# Patient Record
Sex: FEMALE | Race: WHITE | NOT HISPANIC OR LATINO | URBAN - METROPOLITAN AREA
[De-identification: names, ages, dates, MRNs, and addresses within clinical notes are randomized per-mention and may not be internally consistent; named-entity substitution may affect disease eponyms.]

---

## 2018-05-18 ENCOUNTER — OUTPATIENT (OUTPATIENT)
Dept: OUTPATIENT SERVICES | Facility: HOSPITAL | Age: 83
LOS: 1 days | Discharge: HOME | End: 2018-05-18

## 2018-05-18 DIAGNOSIS — C85.88 OTHER SPECIFIED TYPES OF NON-HODGKIN LYMPHOMA, LYMPH NODES OF MULTIPLE SITES: ICD-10-CM

## 2018-05-23 ENCOUNTER — LABORATORY RESULT (OUTPATIENT)
Age: 83
End: 2018-05-23

## 2018-05-23 ENCOUNTER — RESULT REVIEW (OUTPATIENT)
Age: 83
End: 2018-05-23

## 2018-05-23 ENCOUNTER — APPOINTMENT (OUTPATIENT)
Dept: HEMATOLOGY ONCOLOGY | Facility: CLINIC | Age: 83
End: 2018-05-23
Payer: MEDICARE

## 2018-05-23 VITALS
DIASTOLIC BLOOD PRESSURE: 66 MMHG | SYSTOLIC BLOOD PRESSURE: 106 MMHG | OXYGEN SATURATION: 99 % | WEIGHT: 101.41 LBS | TEMPERATURE: 98.2 F | HEART RATE: 72 BPM | RESPIRATION RATE: 16 BRPM

## 2018-05-23 DIAGNOSIS — M06.9 RHEUMATOID ARTHRITIS, UNSPECIFIED: ICD-10-CM

## 2018-05-23 DIAGNOSIS — C83.30 DIFFUSE LARGE B-CELL LYMPHOMA, UNSPECIFIED SITE: ICD-10-CM

## 2018-05-23 DIAGNOSIS — I10 ESSENTIAL (PRIMARY) HYPERTENSION: ICD-10-CM

## 2018-05-23 LAB
BASOPHILS # BLD AUTO: 0.1 K/UL — SIGNIFICANT CHANGE UP (ref 0–0.2)
BASOPHILS NFR BLD AUTO: 0.4 % — SIGNIFICANT CHANGE UP (ref 0–2)
EOSINOPHIL # BLD AUTO: 0 K/UL — SIGNIFICANT CHANGE UP (ref 0–0.5)
EOSINOPHIL NFR BLD AUTO: 0.3 % — SIGNIFICANT CHANGE UP (ref 0–6)
HCT VFR BLD CALC: 35.4 % — SIGNIFICANT CHANGE UP (ref 34.5–45)
HGB BLD-MCNC: 11.5 G/DL — SIGNIFICANT CHANGE UP (ref 11.5–15.5)
LYMPHOCYTES # BLD AUTO: 1.2 K/UL — SIGNIFICANT CHANGE UP (ref 1–3.3)
LYMPHOCYTES # BLD AUTO: 7.8 % — LOW (ref 13–44)
MCHC RBC-ENTMCNC: 31.9 PG — SIGNIFICANT CHANGE UP (ref 27–34)
MCHC RBC-ENTMCNC: 32.4 G/DL — SIGNIFICANT CHANGE UP (ref 32–36)
MCV RBC AUTO: 98.5 FL — SIGNIFICANT CHANGE UP (ref 80–100)
MONOCYTES # BLD AUTO: 0.7 K/UL — SIGNIFICANT CHANGE UP (ref 0–0.9)
MONOCYTES NFR BLD AUTO: 4.8 % — SIGNIFICANT CHANGE UP (ref 2–14)
NEUTROPHILS # BLD AUTO: 12.8 K/UL — HIGH (ref 1.8–7.4)
NEUTROPHILS NFR BLD AUTO: 86.7 % — HIGH (ref 43–77)
PLATELET # BLD AUTO: 285 K/UL — SIGNIFICANT CHANGE UP (ref 150–400)
RBC # BLD: 3.59 M/UL — LOW (ref 3.8–5.2)
RBC # FLD: 13.5 % — SIGNIFICANT CHANGE UP (ref 10.3–14.5)
WBC # BLD: 14.7 K/UL — HIGH (ref 3.8–10.5)
WBC # FLD AUTO: 14.7 K/UL — HIGH (ref 3.8–10.5)

## 2018-05-23 PROCEDURE — 99214 OFFICE O/P EST MOD 30 MIN: CPT

## 2018-05-24 LAB
ALBUMIN SERPL ELPH-MCNC: 3.7 G/DL
ALP BLD-CCNC: 106 U/L
ALT SERPL-CCNC: 25 U/L
ANION GAP SERPL CALC-SCNC: 14 MMOL/L
AST SERPL-CCNC: 21 U/L
B2 MICROGLOB SERPL-MCNC: 2.9 MG/L
BILIRUB SERPL-MCNC: 0.8 MG/DL
BUN SERPL-MCNC: 26 MG/DL
CALCIUM SERPL-MCNC: 9.3 MG/DL
CHLORIDE SERPL-SCNC: 106 MMOL/L
CO2 SERPL-SCNC: 23 MMOL/L
CREAT SERPL-MCNC: 0.89 MG/DL
DEPRECATED KAPPA LC FREE/LAMBDA SER: 0.8 RATIO
GLUCOSE SERPL-MCNC: 150 MG/DL
IGA SER QL IEP: 72 MG/DL
IGG SER QL IEP: 390 MG/DL
IGM SER QL IEP: 47 MG/DL
KAPPA LC CSF-MCNC: 1.18 MG/DL
KAPPA LC SERPL-MCNC: 0.94 MG/DL
LDH SERPL-CCNC: 260 U/L
POTASSIUM SERPL-SCNC: 4.2 MMOL/L
PROT SERPL-MCNC: 5.9 G/DL
SODIUM SERPL-SCNC: 143 MMOL/L

## 2018-05-28 ENCOUNTER — TRANSCRIPTION ENCOUNTER (OUTPATIENT)
Age: 83
End: 2018-05-28

## 2018-05-29 ENCOUNTER — INPATIENT (INPATIENT)
Facility: HOSPITAL | Age: 83
LOS: 2 days | Discharge: NURSING, REHAB OR LONG-TERM CARE | DRG: 494 | End: 2018-06-01
Attending: SPECIALIST | Admitting: SPECIALIST
Payer: MEDICARE

## 2018-05-29 VITALS
DIASTOLIC BLOOD PRESSURE: 95 MMHG | HEART RATE: 100 BPM | OXYGEN SATURATION: 99 % | SYSTOLIC BLOOD PRESSURE: 173 MMHG | RESPIRATION RATE: 18 BRPM

## 2018-05-29 DIAGNOSIS — I87.2 VENOUS INSUFFICIENCY (CHRONIC) (PERIPHERAL): ICD-10-CM

## 2018-05-29 DIAGNOSIS — S82.899A OTHER FRACTURE OF UNSPECIFIED LOWER LEG, INITIAL ENCOUNTER FOR CLOSED FRACTURE: ICD-10-CM

## 2018-05-29 DIAGNOSIS — I89.0 LYMPHEDEMA, NOT ELSEWHERE CLASSIFIED: ICD-10-CM

## 2018-05-29 LAB
ALBUMIN SERPL ELPH-MCNC: 4.4 G/DL — SIGNIFICANT CHANGE UP (ref 3.3–5)
ALP SERPL-CCNC: 152 U/L — HIGH (ref 40–120)
ALT FLD-CCNC: 21 U/L — SIGNIFICANT CHANGE UP (ref 10–45)
ANION GAP SERPL CALC-SCNC: 16 MMOL/L — SIGNIFICANT CHANGE UP (ref 5–17)
APTT BLD: 26.7 SEC — LOW (ref 27.5–37.4)
AST SERPL-CCNC: 20 U/L — SIGNIFICANT CHANGE UP (ref 10–40)
BASOPHILS # BLD AUTO: 0.1 K/UL — SIGNIFICANT CHANGE UP (ref 0–0.2)
BILIRUB SERPL-MCNC: 0.6 MG/DL — SIGNIFICANT CHANGE UP (ref 0.2–1.2)
BLD GP AB SCN SERPL QL: NEGATIVE — SIGNIFICANT CHANGE UP
BUN SERPL-MCNC: 33 MG/DL — HIGH (ref 7–23)
CALCIUM SERPL-MCNC: 10.1 MG/DL — SIGNIFICANT CHANGE UP (ref 8.4–10.5)
CHLORIDE SERPL-SCNC: 102 MMOL/L — SIGNIFICANT CHANGE UP (ref 96–108)
CO2 SERPL-SCNC: 23 MMOL/L — SIGNIFICANT CHANGE UP (ref 22–31)
CREAT SERPL-MCNC: 1.03 MG/DL — SIGNIFICANT CHANGE UP (ref 0.5–1.3)
EOSINOPHIL # BLD AUTO: 0.2 K/UL — SIGNIFICANT CHANGE UP (ref 0–0.5)
GLUCOSE SERPL-MCNC: 121 MG/DL — HIGH (ref 70–99)
HCT VFR BLD CALC: 39.7 % — SIGNIFICANT CHANGE UP (ref 34.5–45)
HGB BLD-MCNC: 12.5 G/DL — SIGNIFICANT CHANGE UP (ref 11.5–15.5)
INR BLD: 0.96 RATIO — SIGNIFICANT CHANGE UP (ref 0.88–1.16)
LYMPHOCYTES # BLD AUTO: 11 % — LOW (ref 13–44)
LYMPHOCYTES # BLD AUTO: 3.4 K/UL — HIGH (ref 1–3.3)
MCHC RBC-ENTMCNC: 31.6 GM/DL — LOW (ref 32–36)
MCHC RBC-ENTMCNC: 32.5 PG — SIGNIFICANT CHANGE UP (ref 27–34)
MCV RBC AUTO: 103 FL — HIGH (ref 80–100)
MONOCYTES # BLD AUTO: 2.2 K/UL — HIGH (ref 0–0.9)
MONOCYTES NFR BLD AUTO: 13 % — SIGNIFICANT CHANGE UP (ref 2–14)
NEUTROPHILS # BLD AUTO: 19.2 K/UL — HIGH (ref 1.8–7.4)
NEUTROPHILS NFR BLD AUTO: 74 % — SIGNIFICANT CHANGE UP (ref 43–77)
PLAT MORPH BLD: NORMAL — SIGNIFICANT CHANGE UP
PLATELET # BLD AUTO: 320 K/UL — SIGNIFICANT CHANGE UP (ref 150–400)
POTASSIUM SERPL-MCNC: 5.2 MMOL/L — SIGNIFICANT CHANGE UP (ref 3.5–5.3)
POTASSIUM SERPL-SCNC: 5.2 MMOL/L — SIGNIFICANT CHANGE UP (ref 3.5–5.3)
PROT SERPL-MCNC: 6.7 G/DL — SIGNIFICANT CHANGE UP (ref 6–8.3)
PROTHROM AB SERPL-ACNC: 10.5 SEC — SIGNIFICANT CHANGE UP (ref 9.8–12.7)
RBC # BLD: 3.86 M/UL — SIGNIFICANT CHANGE UP (ref 3.8–5.2)
RBC # FLD: 13.8 % — SIGNIFICANT CHANGE UP (ref 10.3–14.5)
RBC BLD AUTO: SIGNIFICANT CHANGE UP
RH IG SCN BLD-IMP: POSITIVE — SIGNIFICANT CHANGE UP
RH IG SCN BLD-IMP: POSITIVE — SIGNIFICANT CHANGE UP
SODIUM SERPL-SCNC: 141 MMOL/L — SIGNIFICANT CHANGE UP (ref 135–145)
VARIANT LYMPHS # BLD: 2 % — SIGNIFICANT CHANGE UP (ref 0–6)
WBC # BLD: 25 K/UL — HIGH (ref 3.8–10.5)
WBC # FLD AUTO: 25 K/UL — HIGH (ref 3.8–10.5)

## 2018-05-29 PROCEDURE — 76377 3D RENDER W/INTRP POSTPROCES: CPT | Mod: 26

## 2018-05-29 PROCEDURE — 99233 SBSQ HOSP IP/OBS HIGH 50: CPT

## 2018-05-29 PROCEDURE — 99284 EMERGENCY DEPT VISIT MOD MDM: CPT | Mod: 25,GC

## 2018-05-29 PROCEDURE — 93010 ELECTROCARDIOGRAM REPORT: CPT

## 2018-05-29 PROCEDURE — 73562 X-RAY EXAM OF KNEE 3: CPT | Mod: 26,LT

## 2018-05-29 PROCEDURE — 73600 X-RAY EXAM OF ANKLE: CPT | Mod: 26,59,LT

## 2018-05-29 PROCEDURE — 73610 X-RAY EXAM OF ANKLE: CPT | Mod: 26,77,LT

## 2018-05-29 PROCEDURE — 73502 X-RAY EXAM HIP UNI 2-3 VIEWS: CPT | Mod: 26,RT

## 2018-05-29 PROCEDURE — 99221 1ST HOSP IP/OBS SF/LOW 40: CPT

## 2018-05-29 PROCEDURE — 72170 X-RAY EXAM OF PELVIS: CPT | Mod: 26,59

## 2018-05-29 PROCEDURE — 73700 CT LOWER EXTREMITY W/O DYE: CPT | Mod: 26,LT

## 2018-05-29 PROCEDURE — 71045 X-RAY EXAM CHEST 1 VIEW: CPT | Mod: 26

## 2018-05-29 RX ORDER — CEFAZOLIN SODIUM 1 G
2000 VIAL (EA) INJECTION EVERY 8 HOURS
Qty: 0 | Refills: 0 | Status: COMPLETED | OUTPATIENT
Start: 2018-05-29 | End: 2018-05-31

## 2018-05-29 RX ORDER — TETANUS TOXOID, REDUCED DIPHTHERIA TOXOID AND ACELLULAR PERTUSSIS VACCINE, ADSORBED 5; 2.5; 8; 8; 2.5 [IU]/.5ML; [IU]/.5ML; UG/.5ML; UG/.5ML; UG/.5ML
0.5 SUSPENSION INTRAMUSCULAR ONCE
Qty: 0 | Refills: 0 | Status: COMPLETED | OUTPATIENT
Start: 2018-05-29 | End: 2018-05-29

## 2018-05-29 RX ORDER — FENTANYL CITRATE 50 UG/ML
50 INJECTION INTRAVENOUS ONCE
Qty: 0 | Refills: 0 | Status: DISCONTINUED | OUTPATIENT
Start: 2018-05-29 | End: 2018-05-29

## 2018-05-29 RX ORDER — DOCUSATE SODIUM 100 MG
100 CAPSULE ORAL THREE TIMES A DAY
Qty: 0 | Refills: 0 | Status: DISCONTINUED | OUTPATIENT
Start: 2018-05-29 | End: 2018-06-01

## 2018-05-29 RX ORDER — VANCOMYCIN HCL 1 G
1000 VIAL (EA) INTRAVENOUS ONCE
Qty: 0 | Refills: 0 | Status: COMPLETED | OUTPATIENT
Start: 2018-05-29 | End: 2018-05-29

## 2018-05-29 RX ORDER — SIMVASTATIN 20 MG/1
20 TABLET, FILM COATED ORAL AT BEDTIME
Qty: 0 | Refills: 0 | Status: DISCONTINUED | OUTPATIENT
Start: 2018-05-29 | End: 2018-06-01

## 2018-05-29 RX ORDER — FENTANYL CITRATE 50 UG/ML
100 INJECTION INTRAVENOUS ONCE
Qty: 0 | Refills: 0 | Status: DISCONTINUED | OUTPATIENT
Start: 2018-05-29 | End: 2018-05-29

## 2018-05-29 RX ORDER — ALLOPURINOL 300 MG
100 TABLET ORAL DAILY
Qty: 0 | Refills: 0 | Status: DISCONTINUED | OUTPATIENT
Start: 2018-05-29 | End: 2018-05-29

## 2018-05-29 RX ORDER — ACETAMINOPHEN 500 MG
650 TABLET ORAL EVERY 6 HOURS
Qty: 0 | Refills: 0 | Status: DISCONTINUED | OUTPATIENT
Start: 2018-05-29 | End: 2018-06-01

## 2018-05-29 RX ORDER — ONDANSETRON 8 MG/1
4 TABLET, FILM COATED ORAL EVERY 6 HOURS
Qty: 0 | Refills: 0 | Status: DISCONTINUED | OUTPATIENT
Start: 2018-05-29 | End: 2018-06-01

## 2018-05-29 RX ORDER — ASPIRIN/CALCIUM CARB/MAGNESIUM 324 MG
81 TABLET ORAL
Qty: 0 | Refills: 0 | Status: DISCONTINUED | OUTPATIENT
Start: 2018-05-29 | End: 2018-06-01

## 2018-05-29 RX ORDER — ACETAMINOPHEN 500 MG
1000 TABLET ORAL ONCE
Qty: 0 | Refills: 0 | Status: COMPLETED | OUTPATIENT
Start: 2018-05-29 | End: 2018-05-29

## 2018-05-29 RX ORDER — OXYCODONE HYDROCHLORIDE 5 MG/1
2.5 TABLET ORAL EVERY 6 HOURS
Qty: 0 | Refills: 0 | Status: DISCONTINUED | OUTPATIENT
Start: 2018-05-29 | End: 2018-06-01

## 2018-05-29 RX ORDER — MAGNESIUM HYDROXIDE 400 MG/1
30 TABLET, CHEWABLE ORAL DAILY
Qty: 0 | Refills: 0 | Status: DISCONTINUED | OUTPATIENT
Start: 2018-05-29 | End: 2018-06-01

## 2018-05-29 RX ORDER — MORPHINE SULFATE 50 MG/1
2 CAPSULE, EXTENDED RELEASE ORAL EVERY 6 HOURS
Qty: 0 | Refills: 0 | Status: DISCONTINUED | OUTPATIENT
Start: 2018-05-29 | End: 2018-06-01

## 2018-05-29 RX ORDER — LOSARTAN POTASSIUM 100 MG/1
50 TABLET, FILM COATED ORAL DAILY
Qty: 0 | Refills: 0 | Status: DISCONTINUED | OUTPATIENT
Start: 2018-05-29 | End: 2018-06-01

## 2018-05-29 RX ORDER — SODIUM CHLORIDE 9 MG/ML
1000 INJECTION INTRAMUSCULAR; INTRAVENOUS; SUBCUTANEOUS
Qty: 0 | Refills: 0 | Status: DISCONTINUED | OUTPATIENT
Start: 2018-05-29 | End: 2018-06-01

## 2018-05-29 RX ORDER — SODIUM CHLORIDE 9 MG/ML
1000 INJECTION INTRAMUSCULAR; INTRAVENOUS; SUBCUTANEOUS ONCE
Qty: 0 | Refills: 0 | Status: COMPLETED | OUTPATIENT
Start: 2018-05-29 | End: 2018-05-29

## 2018-05-29 RX ORDER — MIDAZOLAM HYDROCHLORIDE 1 MG/ML
1 INJECTION, SOLUTION INTRAMUSCULAR; INTRAVENOUS ONCE
Qty: 0 | Refills: 0 | Status: DISCONTINUED | OUTPATIENT
Start: 2018-05-29 | End: 2018-05-29

## 2018-05-29 RX ORDER — MORPHINE SULFATE 50 MG/1
4 CAPSULE, EXTENDED RELEASE ORAL ONCE
Qty: 0 | Refills: 0 | Status: DISCONTINUED | OUTPATIENT
Start: 2018-05-29 | End: 2018-05-29

## 2018-05-29 RX ORDER — FOLIC ACID 0.8 MG
1 TABLET ORAL DAILY
Qty: 0 | Refills: 0 | Status: DISCONTINUED | OUTPATIENT
Start: 2018-05-29 | End: 2018-06-01

## 2018-05-29 RX ORDER — OXYCODONE HYDROCHLORIDE 5 MG/1
5 TABLET ORAL EVERY 6 HOURS
Qty: 0 | Refills: 0 | Status: DISCONTINUED | OUTPATIENT
Start: 2018-05-29 | End: 2018-06-01

## 2018-05-29 RX ADMIN — FENTANYL CITRATE 100 MICROGRAM(S): 50 INJECTION INTRAVENOUS at 04:03

## 2018-05-29 RX ADMIN — Medication 100 MILLIGRAM(S): at 05:57

## 2018-05-29 RX ADMIN — TETANUS TOXOID, REDUCED DIPHTHERIA TOXOID AND ACELLULAR PERTUSSIS VACCINE, ADSORBED 0.5 MILLILITER(S): 5; 2.5; 8; 8; 2.5 SUSPENSION INTRAMUSCULAR at 05:56

## 2018-05-29 RX ADMIN — Medication 4 MILLIGRAM(S): at 16:56

## 2018-05-29 RX ADMIN — MORPHINE SULFATE 4 MILLIGRAM(S): 50 CAPSULE, EXTENDED RELEASE ORAL at 04:19

## 2018-05-29 RX ADMIN — Medication 250 MILLIGRAM(S): at 04:00

## 2018-05-29 RX ADMIN — FENTANYL CITRATE 50 MICROGRAM(S): 50 INJECTION INTRAVENOUS at 04:16

## 2018-05-29 RX ADMIN — SODIUM CHLORIDE 1000 MILLILITER(S): 9 INJECTION INTRAMUSCULAR; INTRAVENOUS; SUBCUTANEOUS at 03:41

## 2018-05-29 RX ADMIN — MORPHINE SULFATE 4 MILLIGRAM(S): 50 CAPSULE, EXTENDED RELEASE ORAL at 04:49

## 2018-05-29 RX ADMIN — Medication 650 MILLIGRAM(S): at 23:20

## 2018-05-29 RX ADMIN — Medication 100 MILLIGRAM(S): at 23:48

## 2018-05-29 RX ADMIN — FENTANYL CITRATE 50 MICROGRAM(S): 50 INJECTION INTRAVENOUS at 03:46

## 2018-05-29 RX ADMIN — Medication 650 MILLIGRAM(S): at 23:50

## 2018-05-29 RX ADMIN — OXYCODONE HYDROCHLORIDE 2.5 MILLIGRAM(S): 5 TABLET ORAL at 18:36

## 2018-05-29 RX ADMIN — Medication 650 MILLIGRAM(S): at 12:59

## 2018-05-29 RX ADMIN — Medication 100 MILLIGRAM(S): at 14:54

## 2018-05-29 RX ADMIN — MIDAZOLAM HYDROCHLORIDE 1 MILLIGRAM(S): 1 INJECTION, SOLUTION INTRAMUSCULAR; INTRAVENOUS at 03:49

## 2018-05-29 RX ADMIN — FENTANYL CITRATE 100 MICROGRAM(S): 50 INJECTION INTRAVENOUS at 03:33

## 2018-05-29 RX ADMIN — Medication 400 MILLIGRAM(S): at 23:17

## 2018-05-29 NOTE — ED ADULT NURSE NOTE - OBJECTIVE STATEMENT
89 YO female with PMH CAD, Degenerative disc disease, hyperlipidemia, osteoarthritis, osteoporosis, rheumatoid arthritis, and lymphoma in remission via EMS presenting with complaints of pain and left ankle injury sp fall. EMS found patient in kitchen on the floor. Pt reporting 10/10 pain to the left ankle, starting after fall, worsened with movement, relieved by rest. Pt denies chest pain, shortness of breath, visual disturbances, numbness/tingling, fever, chills, diaphoresis, headache, nausea, vomiting, constipation, diarrhea, or urinary symptoms.   Pt Axox4, gross neuro intact, PERRL 3 mm. Lungs clear throughout bilateral. S1S2 heard. Abdomen soft, non-tender, non-distended. Skin warm, dry, and intact, besides multiple skin tears to the right elbow, abrasion to the left elbow, and open fracture to the left ankle. Bed in lowest position, wheels locked. family present at bedside.

## 2018-05-29 NOTE — CONSULT NOTE ADULT - SUBJECTIVE AND OBJECTIVE BOX
LEVEL II  TRAUMA ACTIVATION    89y Female who has b/l lower extremity lymphedema who slipped and fell from standing after the transudative fluid from her RLE leaked onto the floor and caused her to slip. She presented to the ED with an open medial malleolar fracture. Patient denies losing consciousness.         Primary Survey  A - intact   B - clear to auscultation and equal b/l   C -  138/60    D - GCS 15  E - Exposure obtained      Secondary survey  GEN: NAD, alert and oriented to person place and time, responses appropriate  HEENT: normocephalic, atraumatic, no outward signs of trauma  CV: s1, s2, RRR  PULM/CHEST: CTA B/L, no crepitus, no obvious signs of trauma  ABD: soft, nontender, nondistended, no hematomas, no abrasions or lacerations  : deferred  EXT:warm,echymosis to the posterior RUE and LUE. LLE with Laceration over medial aspect of L Ankle measuring 6 cm,, +TTP medial/lateral malleolus,+ehl/fhl/ta/gs function, dp/pt pulse intact, negative log roll, able to SLR, compartments soft/compressive, extremity warm/well perfused. DP and PT b/l after reduction by ortho          PMH  DDD (degenerative disc disease), lumbar  DDD (degenerative disc disease), cervical  Carotid artery stenosis  Rheumatoid arthritis  Hyperlipidemia  Osteoarthritis  Osteoporosis  Osteoporosis      PSH  Cataract extraction status of right eye with IOL  Epigastric hernia repair  Inguinal hernia left  Inguinal hernia repair  H/O right knee surgery      MEDS  MEDICATIONS  (STANDING):    MEDICATIONS  (PRN):      ALLERGIES  Allergies    Iodine (Anaphylaxis)  Nexium (Unknown)  penicillins (Angioedema)  Prilosec (Unknown)  sulfa drugs (Anaphylaxis)    Intolerances        SOCIAL Hx    LABS                        12.5   25.0  )-----------( 320      ( 29 May 2018 04:21 )             39.7     05-29    141  |  102  |  33<H>  ----------------------------<  121<H>  5.2   |  23  |  1.03    Ca    10.1      29 May 2018 04:21    TPro  6.7  /  Alb  4.4  /  TBili  0.6  /  DBili  x   /  AST  20  /  ALT  21  /  AlkPhos  152<H>  05-29    PT/INR - ( 29 May 2018 04:21 )   PT: 10.5 sec;   INR: 0.96 ratio         PTT - ( 29 May 2018 04:21 )  PTT:26.7 sec          IMAGING    < from: Xray Pelvis AP only (05.29.18 @ 04:35) >    INTERPRETATION:  Foreshortening of the right proximal femur is suggestive   of fracture. Evaluation is somewhat limited secondary to single view and   patient obliquity. Recommend dedicated right hip radiographs for further   evaluation.              ******PRELIMINARY REPORT******    ******PRELIMINARY REPORT******            < end of copied text >    < from: Xray Chest 1 View AP/PA (05.29.18 @ 04:35) >    EXAM:  XR CHEST AP OR PA 1V                            PROCEDURE DATE:  05/29/2018      ******PRELIMINARY REPORT******    ******PRELIMINARY REPORT******              INTERPRETATION:  No urgent findings    < end of copied text >      < from: Xray Ankle Complete 3 Views, Left (05.29.18 @ 04:25) >  INTERPRETATION:  CLINICAL INFORMATION: Evaluate for fracture. Left ankle   pain.    TECHNIQUE: 2 views of the left ankle dated 5/29/2018.    COMPARISON: CT left lower extremity from same day.    IMPRESSION:     There is a displaced trimalleolar fracture with disruption of the ankle   mortise and lateral displacement of the talus with respect to the tibia.   There is overlying casting material.      < end of copied text > LEVEL II  TRAUMA ACTIVATION    89y Female who has b/l lower extremity lymphedema who slipped and fell from standing after the transudative fluid from her RLE leaked onto the floor and caused her to slip. She presented to the ED with an open medial malleolar fracture. Patient denies losing consciousness.  Pt complaining of severe left ankle pain, worse with attempts to move her foot, better with pain medication, since her fall. 10-pt ROS otherwise negative.       Primary Survey  A - intact   B - clear to auscultation and equal b/l   C - 138/60    D - GCS 15, PERRL, moves all extremities (is able to wiggle toes of left foot)  E - Exposure obtained      Secondary survey  GEN: NAD, alert and oriented to person place and time, responses appropriate  HEENT: normocephalic, atraumatic, no outward signs of trauma  C-spine: no tenderness or deformities  CV: s1, s2, RRR  PULM/CHEST: CTA B/L, no crepitus, no obvious signs of trauma  ABD: soft, nontender, nondistended, no hematomas, no abrasions or lacerations  : normal external female genitalia  EXT: warm, echymosis to the posterior RUE and LUE. Bilateral lymphedema, two small ulcers of right medial lower leg, LLE with Laceration over medial aspect of L Ankle measuring 6 cm,, +TTP medial/lateral malleolus,+ehl/fhl/ta/gs function, dp/pt pulse intact, negative log roll, able to SLR, compartments soft/compressive, extremity warm/well perfused. DP and PT b/l after ankle reduction by ortho. Sensation of left foot normal, able to wiggle toes. Foot warm.          PMH  DDD (degenerative disc disease), lumbar  DDD (degenerative disc disease), cervical  Carotid artery stenosis  Rheumatoid arthritis  Hyperlipidemia  Osteoarthritis  Osteoporosis  Osteoporosis      PSH  Cataract extraction status of right eye with IOL  Epigastric hernia repair  Inguinal hernia left  Inguinal hernia repair  H/O right knee surgery    Family Hx: non-contributory    Social Hx: nonsmoker, lives with her  at home    MEDS  MEDICATIONS  (STANDING):    MEDICATIONS  (PRN):      ALLERGIES  Allergies    Iodine (Anaphylaxis)  Nexium (Unknown)  penicillins (Angioedema)  Prilosec (Unknown)  sulfa drugs (Anaphylaxis)    Intolerances        SOCIAL Hx    LABS                        12.5   25.0  )-----------( 320      ( 29 May 2018 04:21 )             39.7     05-29    141  |  102  |  33<H>  ----------------------------<  121<H>  5.2   |  23  |  1.03    Ca    10.1      29 May 2018 04:21    TPro  6.7  /  Alb  4.4  /  TBili  0.6  /  DBili  x   /  AST  20  /  ALT  21  /  AlkPhos  152<H>  05-29    PT/INR - ( 29 May 2018 04:21 )   PT: 10.5 sec;   INR: 0.96 ratio         PTT - ( 29 May 2018 04:21 )  PTT:26.7 sec          IMAGING    < from: Xray Pelvis AP only (05.29.18 @ 04:35) >    INTERPRETATION:  Foreshortening of the right proximal femur is suggestive   of fracture. Evaluation is somewhat limited secondary to single view and   patient obliquity. Recommend dedicated right hip radiographs for further   evaluation.              ******PRELIMINARY REPORT******    ******PRELIMINARY REPORT******            < end of copied text >    < from: Xray Chest 1 View AP/PA (05.29.18 @ 04:35) >    EXAM:  XR CHEST AP OR PA 1V                            PROCEDURE DATE:  05/29/2018      ******PRELIMINARY REPORT******    ******PRELIMINARY REPORT******              INTERPRETATION:  No urgent findings    < end of copied text >      < from: Xray Ankle Complete 3 Views, Left (05.29.18 @ 04:25) >  INTERPRETATION:  CLINICAL INFORMATION: Evaluate for fracture. Left ankle   pain.    TECHNIQUE: 2 views of the left ankle dated 5/29/2018.    COMPARISON: CT left lower extremity from same day.    IMPRESSION:     There is a displaced trimalleolar fracture with disruption of the ankle   mortise and lateral displacement of the talus with respect to the tibia.   There is overlying casting material.      < end of copied text >

## 2018-05-29 NOTE — ED PROVIDER NOTE - MEDICAL DECISION MAKING DETAILS
89 year old female past medical history HTN, RA, who presents to the ED for left open ankle fracture. Level 2 trauma called on arrival. ABCs intact. secondary survey only significant for left ankle fracture. Intact distal pulses 2+, motor and sensations in distal ankle. Ortho at beside with reduction. Obtain labwork, ekg, cxr, pelvic xary, ankle xrays, abx, adacel, admission. Discussed with daughter Dr. Valero [phone]. 89 year old female past medical history HTN, RA, who presents to the ED for left open ankle fracture. Level 2 trauma called on arrival. ABCs intact. secondary survey only significant for left ankle fracture. Intact distal pulses 2+, motor and sensations in distal ankle. Ortho at beside with reduction. Obtain labwork, ekg, cxr, pelvic xary, ankle xrays, abx, adacel, admission. Discussed with daughter Dr. Valero [phone].    Cony TREVINO: 89 y/o female with hx of RA and HTN BIBA after fall at home. Patient reports she fell at home and developed severe L ankle pain. EMS reports patient was found conscious in the kitchen with an open L ankle fracture. Patient is A/OX3 and with severe pain. L DP pulses are palpable and there is sensation. Lungs are clear and abd is soft and nontender. No other injuries and no head trauma noted. L ankle with an open fracture likely trimalleolar. Plan Pre op labs, Xrays, pain control and Trauma and Orhtopedics consult. Patient will need admission.

## 2018-05-29 NOTE — CONSULT NOTE ADULT - ASSESSMENT
89 year old female presenting after a fall with an open left medial malleolus fracture s/p plating.  Vascular surgery consulted to evaluate lower extremity swelling  -On exam, patient has evidence of right lower extremity lymphedema  -Patient had been managed as an outpatient for lymphedema with ACE wraps  -Please obtain lower extremity venous duplex to rule out DVT  -Continue local wound care   -Vascular surgery to follow 89 year old female presenting after a fall with an open left medial malleolus fracture s/p plating.  Vascular surgery consulted to evaluate lower extremity swelling    -On exam, patient has evidence of right lower extremity lymphedema and venous insuff  -Patient had been managed as an outpatient for lymphedema with ACE wraps  -Please obtain lower extremity venous duplex to rule out DVT  -Continue local wound care   -Vascular surgery to follow

## 2018-05-29 NOTE — ED PROVIDER NOTE - PMH
Carotid artery stenosis  8/2005  DDD (degenerative disc disease), cervical    DDD (degenerative disc disease), lumbar    Hyperlipidemia    Osteoarthritis    Osteoporosis    Rheumatoid arthritis

## 2018-05-29 NOTE — CONSULT NOTE ADULT - ATTENDING COMMENTS
Pt seen and examined during level 2 trauma activation. Pt s/p fall at home, no LOC, no impact to her head. Not on anticoagulation. Her only complaint was left ankle pain. C-spine cleared clinically before manipulation of her ankle (prior to this, pt was calm and not distracted). Left ankle with medial laceration and complete tibial displacement obvious through lac. DP intact. After relocation, PT intact as well. Pt able to move her toes, sensation of foot normal, foot warm (no signs of vascular injury). Discussed with Ortho resident. Pt admitted to Ortho service for trimalleolar open fracture-dislocation needing OR given low energy mechanism and no other injuries. Will follow for tertiary survey.
as above

## 2018-05-29 NOTE — ED PROVIDER NOTE - OBJECTIVE STATEMENT
89 year old female past medical history HTN, RA, who presents to the ED for left ankle injury. Patient was walking and tripped and fell. Found to have left open ankle fracture. Reports 10/10 pain. No other muscle/joint pain. No head or neck injury. No LOC. No recent illness, fevers, chills. No chest pain, shortness of breath, dizziness, lightheadedness, prior to or after fall. 89 year old female past medical history HTN, RA, who presents to the ED for left ankle injury. Patient was walking and tripped and fell at [place], found by EMS in the kitchen. Found to have left open ankle fracture. Reports 10/10 pain. No other muscle/joint pain. No head or neck injury. No LOC. No recent illness, fevers, chills. No chest pain, shortness of breath, dizziness, lightheadedness, prior to or after fall.

## 2018-05-29 NOTE — CHART NOTE - NSCHARTNOTEFT_GEN_A_CORE
05-29-18 @ 16:45    Pt comfortable.  Pain well controlled.  No chest pain, no SOB, no N/V.    T(C): 36.8 (05-29-18 @ 08:28), Max: 36.8 (05-29-18 @ 08:28)  HR: 84 (05-29-18 @ 14:00) (80 - 155)  BP: 117/56 (05-29-18 @ 14:00) (93/51 - 209/77)  RR: 16 (05-29-18 @ 14:00) (14 - 28)  SpO2: 100% (05-29-18 @ 14:00) (97% - 100%)    Left ankle in SLC, Left knee in BJ/KI.  Toes warm, mobile, sensate. +DF/PF.    Cap refill brisk.  Motor/Sensory function grossly intact.  R calf soft/NT with   Venodynes intact to RLE.      Postop xrays show good hardware alignment.    Pt s/p I&D and ORIF L Ankle  -Analgesia  -Cont to Monitor  -DVT Prophylaxis    RAEANN Rosa PA-C  Orthopaedic Surgery  [id number]

## 2018-05-29 NOTE — ED PROVIDER NOTE - ATTENDING CONTRIBUTION TO CARE
Attending MD Donnelly:  I personally have seen and examined this patient.  Resident note reviewed and agree on plan of care and except where noted.  See MDM for details.

## 2018-05-29 NOTE — ED PROVIDER NOTE - PSH
Cataract extraction status of right eye with IOL  1/8/2010  Epigastric hernia repair  5/12/2009  H/O right knee surgery  11/5/1992  Inguinal hernia left  10/20 /1995  Inguinal hernia repair  5/19/1982

## 2018-05-29 NOTE — PROGRESS NOTE ADULT - ASSESSMENT
Patient is a 89 year old female, s/p mechanical fall, not on anticoagulation who presents with an open L ankle fracture s/p plating with ortho.   No other traumatic injuries. Of note, patient with multiple skin tears, all superficial, patient unable to say when they occurred. Right leg with significant swelling and weeping, patient says as baseline.   Page [phone] with further trauma surgical questions.

## 2018-05-29 NOTE — CONSULT NOTE ADULT - ASSESSMENT
Patient is a 89 year old female, s/p mechanical fall, not on anticoagulation who presents with an open L ankle fracture reduced by ortho.  -no trauma surgery intervention at this time  -care per Ortho  -follow up on final reads (possible femur fracture)  -monitor DP/PT pulses    ATP/Trauma Sx [id number]

## 2018-05-29 NOTE — CONSULT NOTE ADULT - SUBJECTIVE AND OBJECTIVE BOX
89y Female who has b/l lower extremity lymphedema who slipped and fell from standing after the transudative fluid from her RLE leaked onto the floor and caused her to slip. She presented to the ED with an open medial malleolar fracture. Patient is status post left lower extremity plating.  Vascular surgery consulted for evaluation of lymphedema.  Patient is ambulatory at home.  No report of any calf pain or rest pain.      PAST MEDICAL & SURGICAL HISTORY:  DDD (degenerative disc disease), lumbar  DDD (degenerative disc disease), cervical  Carotid artery stenosis: 8/2005  Rheumatoid arthritis  Hyperlipidemia  Osteoarthritis  Osteoporosis  Cataract extraction status of right eye with IOL: 1/8/2010  Epigastric hernia repair: 5/12/2009  Inguinal hernia left: 10/20 /1995  Inguinal hernia repair: 5/19/1982  H/O right knee surgery: 11/5/1992    ICU Vital Signs Last 24 Hrs  T(C): 36.8 (29 May 2018 08:28), Max: 36.8 (29 May 2018 08:28)  T(F): 98.2 (29 May 2018 08:28), Max: 98.2 (29 May 2018 08:28)  HR: 82 (29 May 2018 09:30) (80 - 155)  BP: 120/56 (29 May 2018 09:30) (93/51 - 209/77)  BP(mean): 82 (29 May 2018 09:30) (69 - 82)  ABP: --  ABP(mean): --  RR: 16 (29 May 2018 09:30) (16 - 28)  SpO2: 99% (29 May 2018 09:30) (97% - 100%)    General:  Sitting up in bed, appears comfortable  Chest:  Breath sounds audible bilaterally; no wheezing, rales or ronchi  Abdomen:  Soft, nontender, nondistended  Extremities:  Unable to examine left lower extremity secondary to cast   Right lower extremity swelling to the knee, 2+ pitting edema, two puntate wounds on medial calf, no active drainage  Dopplerable DP and PT, palpable femoral pulses bilaterally                          12.5   25.0  )-----------( 320      ( 29 May 2018 04:21 )             39.7   05-29    141  |  102  |  33<H>  ----------------------------<  121<H>  5.2   |  23  |  1.03    Ca    10.1      29 May 2018 04:21    TPro  6.7  /  Alb  4.4  /  TBili  0.6  /  DBili  x   /  AST  20  /  ALT  21  /  AlkPhos  152<H>  05-29 89y Female who has b/l lower extremity lymphedema who slipped and fell from standing after the transudative fluid from her RLE leaked onto the floor and caused her to slip. She presented to the ED with an open medial malleolar fracture. Patient is status post left lower extremity plating.  Vascular surgery consulted for evaluation of lymphedema.  Patient is ambulatory at home.  No report of any calf pain or rest pain.      PAST MEDICAL & SURGICAL HISTORY:  DDD (degenerative disc disease), lumbar  DDD (degenerative disc disease), cervical  Carotid artery stenosis: 8/2005  Rheumatoid arthritis  Hyperlipidemia  Osteoarthritis  Osteoporosis  Cataract extraction status of right eye with IOL: 1/8/2010  Epigastric hernia repair: 5/12/2009  Inguinal hernia left: 10/20 /1995  Inguinal hernia repair: 5/19/1982  H/O right knee surgery: 11/5/1992    ICU Vital Signs Last 24 Hrs  T(C): 36.8 (29 May 2018 08:28), Max: 36.8 (29 May 2018 08:28)  T(F): 98.2 (29 May 2018 08:28), Max: 98.2 (29 May 2018 08:28)  HR: 82 (29 May 2018 09:30) (80 - 155)  BP: 120/56 (29 May 2018 09:30) (93/51 - 209/77)  BP(mean): 82 (29 May 2018 09:30) (69 - 82)  ABP: --  ABP(mean): --  RR: 16 (29 May 2018 09:30) (16 - 28)  SpO2: 99% (29 May 2018 09:30) (97% - 100%)    General:  Sitting up in bed, appears comfortable  Chest:  Breath sounds audible bilaterally; no wheezing, rales or ronchi  Abdomen:  Soft, nontender, nondistended  Extremities:  Unable to examine left lower extremity secondary to cast   Right lower extremity swelling to the knee, 2+ pitting edema, two punctate wounds on medial calf, no active drainage  Dopplerable DP and PT, palpable femoral pulses bilaterally                          12.5   25.0  )-----------( 320      ( 29 May 2018 04:21 )             39.7   05-29    141  |  102  |  33<H>  ----------------------------<  121<H>  5.2   |  23  |  1.03    Ca    10.1      29 May 2018 04:21    TPro  6.7  /  Alb  4.4  /  TBili  0.6  /  DBili  x   /  AST  20  /  ALT  21  /  AlkPhos  152<H>  05-29

## 2018-05-29 NOTE — PROGRESS NOTE ADULT - SUBJECTIVE AND OBJECTIVE BOX
Tertiary Trauma Survey (TTS)    Date of TTS:     5/29                         Time: 7am  Admit Date:       5/29                       Trauma Activation: II  Admit GCS: 15    HPI:  89y Female ambulatory with walker presents as level 2 trauma with open left ankle fracture/dislocation. Denies HS/LOC. No other pain/injuries. Denies fevers/chills.   Patient fell off of chair  Pt has chronic lymphedema b/l LE  Discussed with patients daughter who is physician, Dr. Hampton ([phone])      MEDICATIONS  (STANDING):    Allergies    Iodine (Anaphylaxis)  Nexium (Unknown)  penicillins (Angioedema)  Prilosec (Unknown)  sulfa drugs (Anaphylaxis)    Intolerances      Imaging: XR demonstrates L ankle fracture                        12.5   25.0  )-----------( 320      ( 29 May 2018 04:21 )             39.7     29 May 2018 04:21    141    |  102    |  33     ----------------------------<  121    5.2     |  23     |  1.03     Ca    10.1       29 May 2018 04:21    TPro  6.7    /  Alb  4.4    /  TBili  0.6    /  DBili  x      /  AST  20     /  ALT  21     /  AlkPhos  152    29 May 2018 04:21    PT/INR - ( 29 May 2018 04:21 )   PT: 10.5 sec;   INR: 0.96 ratio         PTT - ( 29 May 2018 04:21 )  PTT:26.7 sec  Vital Signs Last 24 Hrs  T(C): 36.6 (05-29-18 @ 05:30), Max: 36.7 (05-29-18 @ 03:45)  T(F): 97.9 (05-29-18 @ 05:30), Max: 98 (05-29-18 @ 03:45)  HR: 87 (05-29-18 @ 05:30) (87 - 155)  BP: 105/57 (05-29-18 @ 05:30) (105/57 - 209/77)  BP(mean): --  RR: 20 (05-29-18 @ 05:30) (18 - 28)  SpO2: 100% (05-29-18 @ 05:30) (99% - 100%)    Physical Exam  Gen: Nad  LLE: Laceration over medial aspect of L Ankle measuring 6 cm,, +TTP medial/lateral malleolus,+ehl/fhl/ta/gs function, dp/pt pulse intact, negative log roll, able to SLR, compartments soft/compressive, extremity warm/well perfused  Secondary Survey: Benign at time of trauma    Irrigated and splinted in trauma bay, IV antibiotics started    A/P: 89y Female with L Grade II OPen  ankle fracture dislocation  PLan for Emergent OR w/ Dr. Carrera (29 May 2018 06:29)      PAST MEDICAL & SURGICAL HISTORY:  DDD (degenerative disc disease), lumbar  DDD (degenerative disc disease), cervical  Carotid artery stenosis: 8/2005  Rheumatoid arthritis  Hyperlipidemia  Osteoarthritis  Osteoporosis  Cataract extraction status of right eye with IOL: 1/8/2010  Epigastric hernia repair: 5/12/2009  Inguinal hernia left: 10/20 /1995  Inguinal hernia repair: 5/19/1982  H/O right knee surgery: 11/5/1992      FAMILY HISTORY:    [  ] Family history not pertinent as reviewed with the patient and family    SOCIAL HISTORY:    Medications (inpatient): allopurinol 100 milliGRAM(s) Oral daily  aspirin enteric coated 81 milliGRAM(s) Oral two times a day  ceFAZolin   IVPB 2000 milliGRAM(s) IV Intermittent every 8 hours  docusate sodium 100 milliGRAM(s) Oral three times a day  folic acid 1 milliGRAM(s) Oral daily  losartan 50 milliGRAM(s) Oral daily  multivitamin 1 Tablet(s) Oral daily  simvastatin 20 milliGRAM(s) Oral at bedtime  sodium chloride 0.9%. 1000 milliLiter(s) IV Continuous <Continuous>    Medications (PRN):acetaminophen   Tablet 650 milliGRAM(s) Oral every 6 hours PRN  acetaminophen   Tablet. 650 milliGRAM(s) Oral every 6 hours PRN  aluminum hydroxide/magnesium hydroxide/simethicone Suspension 30 milliLiter(s) Oral four times a day PRN  magnesium hydroxide Suspension 30 milliLiter(s) Oral daily PRN  morphine  - Injectable 2 milliGRAM(s) IV Push every 6 hours PRN  ondansetron Injectable 4 milliGRAM(s) IV Push every 6 hours PRN  oxyCODONE    IR 5 milliGRAM(s) Oral every 6 hours PRN  oxyCODONE    IR 2.5 milliGRAM(s) Oral every 6 hours PRN    Allergies: Iodine (Anaphylaxis)  Nexium (Unknown)  penicillins (Angioedema)  Prilosec (Unknown)  sulfa drugs (Anaphylaxis)  (Intolerances: )    Vital Signs Last 24 Hrs  T(C): 36.8 (29 May 2018 08:28), Max: 36.8 (29 May 2018 08:28)  T(F): 98.2 (29 May 2018 08:28), Max: 98.2 (29 May 2018 08:28)  HR: 84 (29 May 2018 14:00) (80 - 155)  BP: 134/63 (29 May 2018 13:00) (93/51 - 209/77)  BP(mean): 91 (29 May 2018 13:00) (69 - 103)  RR: 16 (29 May 2018 14:00) (14 - 28)  SpO2: 100% (29 May 2018 14:00) (97% - 100%)  Drug Dosing Weight                            12.5   25.0  )-----------( 320      ( 29 May 2018 04:21 )             39.7     05-29    141  |  102  |  33<H>  ----------------------------<  121<H>  5.2   |  23  |  1.03    Ca    10.1      29 May 2018 04:21    TPro  6.7  /  Alb  4.4  /  TBili  0.6  /  DBili  x   /  AST  20  /  ALT  21  /  AlkPhos  152<H>  05-29    PT/INR - ( 29 May 2018 04:21 )   PT: 10.5 sec;   INR: 0.96 ratio         PTT - ( 29 May 2018 04:21 )  PTT:26.7 sec      List Injuries Identified to Date:    List Operative and Interventional Radiological Procedures:     Consults (Date):  [  ] Neurosurgery   [x  ] Orthopedics  [  ] Plastics  [  ] Urology  [  ] PM&R  [  ] Social Work    RADIOLOGICAL FINDINGS REVIEW:  CXR: clear lungs     Pelvis Films:   There is external rotation of the right hip limiting evaluation of the   right femoral neck. Repeat dedicated hip imaging is recommended. There is   no left hip fracture. Degenerative changes noted at the pubic symphysis,   SI joints and lower lumbar spine.    C-Spine Films:    T/L/S Spine Films:    Extremity Films: L. knee:  There is a moderate knee joint effusion. There is moderate to   severe lateral tibiofemoral compartment osteoarthrosis. The nondisplaced   lateral tibial plateau fracture which is better visualized on the recent   CT scan. There is also a minimally displaced fibular neck and proximal   shaft fracture again noted.    L. ankle:  No overlying cast secures osseous detail. There is interval   placement of a lateral plate and screws transfixing a distal fibular   shaft fracture. There is one syndesmotic screw extending through the   lateral plate as well. 2 partially threaded screws extend through the   medial malleolus fracture. There is near-anatomic alignment.      Head CT:    C-Spine CT:    Neck CT:    Chest CT:    ABD/Pelvis CT:    Other: Lower extremity CT: 1.  Trimalleolar fracture-dislocation of the left ankle consistent with a   supination exorotation mechanism.  2.  Nondisplaced lateral tibial plateau fracture, incompletely imaged.   Consider dedicated knee imaging for further evaluation.  3.  Minimally displaced fracture of the proximal fibula.    Interpretation of Findings:    Patient s/p OR with orthopedic surgery for left ankle. No other traumatic injuries. Of note, patient with multiple skin tears, all superficial, patient unable to say when they occurred. Right leg with significant swelling and weeping.

## 2018-05-29 NOTE — H&P ADULT - HISTORY OF PRESENT ILLNESS
89y Female ambulatory with walker presents as level 2 trauma with open left ankle fracture/dislocation. Denies HS/LOC. No other pain/injuries. Denies fevers/chills.   Patient fell off of chair  Pt has chronic lymphedema b/l LE  Discussed with patients daughter who is physician, Dr. Hampton ([phone])      MEDICATIONS  (STANDING):    Allergies    Iodine (Anaphylaxis)  Nexium (Unknown)  penicillins (Angioedema)  Prilosec (Unknown)  sulfa drugs (Anaphylaxis)    Intolerances      Imaging: XR demonstrates R/L ankle fracture                        12.5   25.0  )-----------( 320      ( 29 May 2018 04:21 )             39.7     29 May 2018 04:21    141    |  102    |  33     ----------------------------<  121    5.2     |  23     |  1.03     Ca    10.1       29 May 2018 04:21    TPro  6.7    /  Alb  4.4    /  TBili  0.6    /  DBili  x      /  AST  20     /  ALT  21     /  AlkPhos  152    29 May 2018 04:21    PT/INR - ( 29 May 2018 04:21 )   PT: 10.5 sec;   INR: 0.96 ratio         PTT - ( 29 May 2018 04:21 )  PTT:26.7 sec  Vital Signs Last 24 Hrs  T(C): 36.6 (05-29-18 @ 05:30), Max: 36.7 (05-29-18 @ 03:45)  T(F): 97.9 (05-29-18 @ 05:30), Max: 98 (05-29-18 @ 03:45)  HR: 87 (05-29-18 @ 05:30) (87 - 155)  BP: 105/57 (05-29-18 @ 05:30) (105/57 - 209/77)  BP(mean): --  RR: 20 (05-29-18 @ 05:30) (18 - 28)  SpO2: 100% (05-29-18 @ 05:30) (99% - 100%)    Physical Exam  Gen: Nad  LLE: Laceration over medial aspect of L Ankle measuring 6 cm,, +TTP medial/lateral malleolus,+ehl/fhl/ta/gs function, dp/pt pulse intact, negative log roll, able to SLR, compartments soft/compressive, extremity warm/well perfused  Secondary Survey: Benign at time of trauma    Irrigated and splinted in trauma bay, IV antibiotics started    A/P: 89y Female with L Grade II OPen  ankle fracture dislocation  PLan for Emergent OR w/ Dr. Carrera

## 2018-05-30 ENCOUNTER — TRANSCRIPTION ENCOUNTER (OUTPATIENT)
Age: 83
End: 2018-05-30

## 2018-05-30 LAB
ANION GAP SERPL CALC-SCNC: 13 MMOL/L — SIGNIFICANT CHANGE UP (ref 5–17)
APPEARANCE UR: CLEAR — SIGNIFICANT CHANGE UP
BASOPHILS # BLD AUTO: 0 K/UL — SIGNIFICANT CHANGE UP (ref 0–0.2)
BASOPHILS NFR BLD AUTO: 0.1 % — SIGNIFICANT CHANGE UP (ref 0–2)
BILIRUB UR-MCNC: NEGATIVE — SIGNIFICANT CHANGE UP
BUN SERPL-MCNC: 22 MG/DL — SIGNIFICANT CHANGE UP (ref 7–23)
CALCIUM SERPL-MCNC: 8.9 MG/DL — SIGNIFICANT CHANGE UP (ref 8.4–10.5)
CHLORIDE SERPL-SCNC: 103 MMOL/L — SIGNIFICANT CHANGE UP (ref 96–108)
CO2 SERPL-SCNC: 22 MMOL/L — SIGNIFICANT CHANGE UP (ref 22–31)
COLOR SPEC: SIGNIFICANT CHANGE UP
CREAT SERPL-MCNC: 0.81 MG/DL — SIGNIFICANT CHANGE UP (ref 0.5–1.3)
DIFF PNL FLD: NEGATIVE — SIGNIFICANT CHANGE UP
EOSINOPHIL # BLD AUTO: 0.1 K/UL — SIGNIFICANT CHANGE UP (ref 0–0.5)
EOSINOPHIL NFR BLD AUTO: 0.3 % — SIGNIFICANT CHANGE UP (ref 0–6)
GLUCOSE SERPL-MCNC: 118 MG/DL — HIGH (ref 70–99)
GLUCOSE UR QL: NEGATIVE — SIGNIFICANT CHANGE UP
HCT VFR BLD CALC: 30.8 % — LOW (ref 34.5–45)
HGB BLD-MCNC: 9.9 G/DL — LOW (ref 11.5–15.5)
KETONES UR-MCNC: NEGATIVE — SIGNIFICANT CHANGE UP
LEUKOCYTE ESTERASE UR-ACNC: NEGATIVE — SIGNIFICANT CHANGE UP
LYMPHOCYTES # BLD AUTO: 1.2 K/UL — SIGNIFICANT CHANGE UP (ref 1–3.3)
LYMPHOCYTES # BLD AUTO: 6.3 % — LOW (ref 13–44)
MCHC RBC-ENTMCNC: 32.2 GM/DL — SIGNIFICANT CHANGE UP (ref 32–36)
MCHC RBC-ENTMCNC: 32.8 PG — SIGNIFICANT CHANGE UP (ref 27–34)
MCV RBC AUTO: 102 FL — HIGH (ref 80–100)
MONOCYTES # BLD AUTO: 1.7 K/UL — HIGH (ref 0–0.9)
MONOCYTES NFR BLD AUTO: 8.5 % — SIGNIFICANT CHANGE UP (ref 2–14)
NEUTROPHILS # BLD AUTO: 16.5 K/UL — HIGH (ref 1.8–7.4)
NEUTROPHILS NFR BLD AUTO: 84.9 % — HIGH (ref 43–77)
NITRITE UR-MCNC: NEGATIVE — SIGNIFICANT CHANGE UP
PH UR: 6.5 — SIGNIFICANT CHANGE UP (ref 5–8)
PLATELET # BLD AUTO: 205 K/UL — SIGNIFICANT CHANGE UP (ref 150–400)
POTASSIUM SERPL-MCNC: 4.1 MMOL/L — SIGNIFICANT CHANGE UP (ref 3.5–5.3)
POTASSIUM SERPL-SCNC: 4.1 MMOL/L — SIGNIFICANT CHANGE UP (ref 3.5–5.3)
PROT UR-MCNC: NEGATIVE — SIGNIFICANT CHANGE UP
RBC # BLD: 3.02 M/UL — LOW (ref 3.8–5.2)
RBC # FLD: 13.5 % — SIGNIFICANT CHANGE UP (ref 10.3–14.5)
SODIUM SERPL-SCNC: 138 MMOL/L — SIGNIFICANT CHANGE UP (ref 135–145)
SP GR SPEC: 1.01 — SIGNIFICANT CHANGE UP (ref 1.01–1.02)
UROBILINOGEN FLD QL: NEGATIVE — SIGNIFICANT CHANGE UP
WBC # BLD: 19.5 K/UL — HIGH (ref 3.8–10.5)
WBC # FLD AUTO: 19.5 K/UL — HIGH (ref 3.8–10.5)

## 2018-05-30 PROCEDURE — 99232 SBSQ HOSP IP/OBS MODERATE 35: CPT

## 2018-05-30 PROCEDURE — 99222 1ST HOSP IP/OBS MODERATE 55: CPT

## 2018-05-30 RX ADMIN — Medication 81 MILLIGRAM(S): at 11:10

## 2018-05-30 RX ADMIN — Medication 650 MILLIGRAM(S): at 11:41

## 2018-05-30 RX ADMIN — Medication 650 MILLIGRAM(S): at 12:11

## 2018-05-30 RX ADMIN — Medication 100 MILLIGRAM(S): at 06:32

## 2018-05-30 RX ADMIN — Medication 100 MILLIGRAM(S): at 22:10

## 2018-05-30 RX ADMIN — Medication 81 MILLIGRAM(S): at 22:10

## 2018-05-30 RX ADMIN — Medication 1 MILLIGRAM(S): at 11:10

## 2018-05-30 RX ADMIN — LOSARTAN POTASSIUM 50 MILLIGRAM(S): 100 TABLET, FILM COATED ORAL at 06:32

## 2018-05-30 RX ADMIN — SIMVASTATIN 20 MILLIGRAM(S): 20 TABLET, FILM COATED ORAL at 22:10

## 2018-05-30 RX ADMIN — OXYCODONE HYDROCHLORIDE 5 MILLIGRAM(S): 5 TABLET ORAL at 11:10

## 2018-05-30 RX ADMIN — Medication 1 TABLET(S): at 11:10

## 2018-05-30 RX ADMIN — Medication 100 MILLIGRAM(S): at 13:09

## 2018-05-30 RX ADMIN — Medication 4 MILLIGRAM(S): at 06:32

## 2018-05-30 RX ADMIN — OXYCODONE HYDROCHLORIDE 5 MILLIGRAM(S): 5 TABLET ORAL at 11:40

## 2018-05-30 NOTE — DISCHARGE NOTE ADULT - HOSPITAL COURSE
Admit: 5/29/18  Dx: Open Left Ankle Fracture  Procedure: Open Reduction Internal Fixation of Left Ankle Fracture  Surgeon: Dr. Carrera       History of Present Illness:  History of Present Illness: 	  89y Female ambulatory with walker presents as level 2 trauma with open left ankle fracture/dislocation. Denies HS/LOC. No other pain/injuries. Denies fevers/chills.   Patient fell off of chair  Pt has chronic lymphedema b/l LE  Discussed with patients daughter who is physician, Dr. Hampton ([phone])      MEDICATIONS  (STANDING):    Allergies    Iodine (Anaphylaxis)  Nexium (Unknown)  penicillins (Angioedema)  Prilosec (Unknown)  sulfa drugs (Anaphylaxis)    Hospital Course:   5/29/18: Patient presented to [facility] Emergency Room with Left Ankle Injury S/P Fall. Patient taken emergently to Operating Room for Open Reduction Internal Fixation of Open Left Ankle Fracture. Patient tolerated the procedure well, no complications. Transferred from the Recovery Room to [address] [place] [address].  5/30/18: Evaluated and treated by Physical Therapy whom recommended Rehab for discharge disposition. Admit: 5/29/18  Dx: Open Left Ankle Fracture  Procedure: Open Reduction Internal Fixation of Left Ankle Fracture  Surgeon: Dr. Carrera       History of Present Illness:  History of Present Illness: 	  89y Female ambulatory with walker presents as level 2 trauma with open left ankle fracture/dislocation. Denies HS/LOC. No other pain/injuries. Denies fevers/chills.   Patient fell off of chair  Pt has chronic lymphedema b/l LE  Discussed with patients daughter who is physician, Dr. Hampton ([phone])      MEDICATIONS  (STANDING):    Allergies    Iodine (Anaphylaxis)  Nexium (Unknown)  penicillins (Angioedema)  Prilosec (Unknown)  sulfa drugs (Anaphylaxis)    Hospital Course:   5/29/18: Patient presented to [facility] Emergency Room with Left Ankle Injury S/P Fall. Patient taken emergently to Operating Room for Open Reduction Internal Fixation of Open Left Ankle Fracture. Patient tolerated the procedure well, no complications. Transferred from the Recovery Room to [address] [place] [address].  Vascular following to monitor circulation.  5/30/18: Evaluated and treated by Physical Therapy whom recommended Rehab for discharge disposition.  6/1/18-Patient will be discharged when bed available at subacute rehab.

## 2018-05-30 NOTE — PROGRESS NOTE ADULT - ASSESSMENT
Patient is a 89 year old female, s/p mechanical fall, not on anticoagulation who presents with an open L ankle fracture reduced by ortho s/p repair now POD 1  -duplexes pending of lower extremities    Vasc Sx 9009 Patient is a 89 year old female, s/p mechanical fall, not on anticoagulation who presents with an open L ankle fracture reduced by ortho s/p repair now POD 1  -recommend rle venous duplex s/o dvt   will follow

## 2018-05-30 NOTE — DISCHARGE NOTE ADULT - CARE PLAN
Principal Discharge DX:	Type I or II open fracture of left ankle, initial encounter  Goal:	Painless ambulation. Return to normal activities.  Assessment and plan of treatment:	Follow up with Dr. Carrera upon discharge from Rehab Facility--Call office for appointment. Principal Discharge DX:	Type I or II open fracture of left ankle, initial encounter  Goal:	Painless ambulation. Return to normal activities.  Assessment and plan of treatment:	Follow up with Dr. Carrera upon discharge from Rehab Facility--Call office for appointment.  Physical Therapy-non-weight bearing left lower extremity.  Locked ortega knee immobilizer at all times. Principal Discharge DX:	Type I or II open fracture of left ankle, initial encounter  Goal:	Painless ambulation. Return to normal activities.  Assessment and plan of treatment:	Follow up with Dr. Carrera upon discharge from Rehab Facility--Call office for appointment.  Physical Therapy-non-weight bearing left lower extremity.  Locked ortega knee immobilizer at all times.  Dressing changes with xeroform and 4x4 to medial cast window daily.  Keep dressing/splint clean, dry and intact.  Have doctor remove staples/sutures postop day 14 and apply steristrips if applicable.

## 2018-05-30 NOTE — DISCHARGE NOTE ADULT - PATIENT PORTAL LINK FT
You can access the [facility] Patient Portal, offered by [facility], by registering with the following website: [email or web address]

## 2018-05-30 NOTE — PHYSICAL THERAPY INITIAL EVALUATION ADULT - MANUAL MUSCLE TESTING RESULTS, REHAB EVAL
grosslt 3  to 3+/5 b/l up[per and  right lower 3/5 , left 2+ hip and knee and ankle in immobilizer and cast

## 2018-05-30 NOTE — PHYSICAL THERAPY INITIAL EVALUATION ADULT - TRANSFER SAFETY CONCERNS NOTED: SIT/STAND, REHAB EVAL
decreased step length/inability to maintain weight-bearing restrictions w/o assist/stepping too close to front of assistive device/decreased weight-shifting ability

## 2018-05-30 NOTE — DISCHARGE NOTE ADULT - CARE PROVIDER_API CALL
Jose M Carrera), Orthopaedic Surgery  [address] [place] [address]  Phone: [phone]  Fax: [phone]

## 2018-05-30 NOTE — DISCHARGE NOTE ADULT - PLAN OF CARE
Painless ambulation. Return to normal activities. Follow up with Dr. Carrera upon discharge from Rehab Facility--Call office for appointment. Follow up with Dr. Carrera upon discharge from Rehab Facility--Call office for appointment.  Physical Therapy-non-weight bearing left lower extremity.  Locked ortega knee immobilizer at all times. Follow up with Dr. Carrera upon discharge from Rehab Facility--Call office for appointment.  Physical Therapy-non-weight bearing left lower extremity.  Locked ortega knee immobilizer at all times.  Dressing changes with xeroform and 4x4 to medial cast window daily.  Keep dressing/splint clean, dry and intact.  Have doctor remove staples/sutures postop day 14 and apply steristrips if applicable.

## 2018-05-30 NOTE — PHYSICAL THERAPY INITIAL EVALUATION ADULT - CRITERIA FOR SKILLED THERAPEUTIC INTERVENTIONS
impairments found/risk reduction/prevention/therapy frequency/anticipated discharge recommendation/functional limitations in following categories/rehab potential/predicted duration of therapy intervention

## 2018-05-30 NOTE — DISCHARGE NOTE ADULT - NS AS ACTIVITY OBS
Do not make important decisions/Do not drive or operate machinery/Walking-Outdoors allowed/Walking-Indoors allowed/Stairs allowed/No Heavy lifting/straining

## 2018-05-30 NOTE — PHYSICAL THERAPY INITIAL EVALUATION ADULT - RANGE OF MOTION EXAMINATION, REHAB EVAL
except left lower unable to assess secondary to cast and knee immobilizer/Right UE ROM was WNL (within normal limits)/Left UE ROM was WNL (within normal limits)/Right UE ROM was WFL (within functional limits)

## 2018-05-30 NOTE — DISCHARGE NOTE ADULT - ADDITIONAL INSTRUCTIONS
It is highly recommended you follow up with your Primary Care Physician within 4 weeks of discharge to discuss recent surgery/  general checkup/possible medication adjustment.

## 2018-05-30 NOTE — CONSULT NOTE ADULT - SUBJECTIVE AND OBJECTIVE BOX
Patient is a 89y old  Female who presents with a chief complaint of Open Left Ankle Fracture   HPI:  89y Female ambulatory with walker presents as level 2 trauma with open left ankle fracture/dislocation. Denies HS/LOC. No other pain/injuries. Denies fevers/chills.   Patient fell off chair  Pt has chronic lymphedema b/l LE    MEDICATIONS  (STANDING):    Allergies    Iodine (Anaphylaxis)  Nexium (Unknown)  penicillins (Angioedema)  Prilosec (Unknown)  sulfa drugs (Anaphylaxis)    Intolerances      Imaging: XR demonstrates R/L ankle fracture                        12.5   25.0  )-----------( 320      ( 29 May 2018 04:21 )             39.7     29 May 2018 04:21    141    |  102    |  33     ----------------------------<  121    5.2     |  23     |  1.03     Ca    10.1       29 May 2018 04:21    TPro  6.7    /  Alb  4.4    /  TBili  0.6    /  DBili  x      /  AST  20     /  ALT  21     /  AlkPhos  152    29 May 2018 04:21    PT/INR - ( 29 May 2018 04:21 )   PT: 10.5 sec;   INR: 0.96 ratio         PTT - ( 29 May 2018 04:21 )  PTT:26.7 sec  Vital Signs Last 24 Hrs  T(C): 36.6 (18 @ 05:30), Max: 36.7 (18 @ 03:45)  T(F): 97.9 (18 @ 05:30), Max: 98 (18 @ 03:45)  HR: 87 (18 @ 05:30) (87 - 155)  BP: 105/57 (18 @ 05:30) (105/57 - 209/77)  BP(mean): --  RR: 20 (18 @ 05:30) (18 - 28)  SpO2: 100% (18 @ 05:30) (99% - 100%)    Physical Exam  Gen: Nad  LLE: Laceration over medial aspect of L Ankle measuring 6 cm,, +TTP medial/lateral malleolus,+ehl/fhl/ta/gs function, dp/pt pulse intact, negative log roll, able to SLR, compartments soft/compressive, extremity warm/well perfused  Secondary Survey: Benign at time of trauma    Irrigated and splinted in trauma bay, IV antibiotics started    A/P: 89y Female with L Grade II OPen  ankle fracture dislocation  PLan for Emergent OR w/ Dr. Carrera (29 May 2018 06:29)      HOSPITAL COURSE:    PAST MEDICAL & SURGICAL HISTORY:  DDD (degenerative disc disease), lumbar  DDD (degenerative disc disease), cervical  Carotid artery stenosis: 2005  Rheumatoid arthritis  Hyperlipidemia  Osteoarthritis  Osteoporosis  Cataract extraction status of right eye with IOL: 2010  Epigastric hernia repair: 2009  Inguinal hernia left: 10/20 /1995  Inguinal hernia repair: 1982  H/O right knee surgery: 1992    Allergies    Iodine (Anaphylaxis)  Nexium (Unknown)  penicillins (Angioedema)  Prilosec (Unknown)  sulfa drugs (Anaphylaxis)    Intolerances      FAMILY HISTORY: not relevant      Social History:     Functional status (prior to admission):  Independent [x ] moderately [ ] fully   Dependent   [ ] moderately [ ] fully    Ambulation status  [ ] independant  [x ] cane   [ x] 2- wheeled walker  [ ] 4-wheeled walker   [ ] wheelchair   [ ] motorized wheelchair  [ ] bedbound    Pain:                        [ ] No [ x] Yes             [ ] Mild [x ] Moderate [ ] Severe    Onset -after fall  Location -Lt ankle  Duration -continuous  Character -"electric shock like", got better after surgery  Alleviating/Aggravating -moving  left leg  Radiation -no      Review of Systems:   CONSTITUTIONAL: No fever, weight loss, or fatigue  EYES: No eye pain, visual disturbances, or discharge  ENMT:  No difficulty hearing, tinnitus, vertigo; No sinus or throat pain  NECK: No pain or stiffness  BREASTS: No pain, masses, or nipple discharge  RESPIRATORY: No cough, wheezing, chills or hemoptysis; No shortness of breath  CARDIOVASCULAR: No chest pain, palpitations, dizziness, or leg swelling  GASTROINTESTINAL: No abdominal or epigastric pain. No nausea, vomiting, or hematemesis; No diarrhea or constipation. No melena or hematochezia.  GENITOURINARY: No dysuria, frequency, hematuria, or incontinence  NEUROLOGICAL: No headaches, memory loss, loss of strength, numbness, or tremors  SKIN: No itching, burning, rashes, or lesions   LYMPH NODES: No enlarged glands  ENDOCRINE: No heat or cold intolerance; No hair loss  MUSCULOSKELETAL: No joint pain or swelling; No muscle, back, or extremity pain  PSYCHIATRIC: No depression, anxiety, mood swings, or difficulty sleeping  HEME/LYMPH: No easy bruising, or bleeding gums  ALLERGY AND IMMUNOLOGIC: No hives or eczema    Dyspnea:                [x ] No [ ] Yes             [ ] Mild [ ] Moderate [ ] Severe  Anxiety:                  [x ] No [ ] Yes             [ ] Mild [ ] Moderate [ ] Severe  Fatigue:                  [ ] No [x ] Yes             [ ] Mild [ ] Moderate [ ] Severe  Nausea:                  [ x] No [ ] Yes             [ ] Mild [ ] Moderate [ ] Severe  Loss of appetite:   [ x] No [ ] Yes             [ ] Mild [ ] Moderate [ ] Severe  Constipation:        [x ] No [ ] Yes             [ ] Mild [ ] Moderate [ ] Severe    [x ] All other review of systems negative   [ ] Unable to obtain due to poor mentation     MEDICATIONS  (STANDING):  aspirin enteric coated 81 milliGRAM(s) Oral two times a day  ceFAZolin   IVPB 2000 milliGRAM(s) IV Intermittent every 8 hours  docusate sodium 100 milliGRAM(s) Oral three times a day  folic acid 1 milliGRAM(s) Oral daily  losartan 50 milliGRAM(s) Oral daily  methylPREDNISolone 4 milliGRAM(s) Oral daily  multivitamin 1 Tablet(s) Oral daily  simvastatin 20 milliGRAM(s) Oral at bedtime  sodium chloride 0.9%. 1000 milliLiter(s) (75 mL/Hr) IV Continuous <Continuous>    MEDICATIONS  (PRN):  acetaminophen   Tablet 650 milliGRAM(s) Oral every 6 hours PRN For Temp greater than 38 C (100.4 F)  acetaminophen   Tablet. 650 milliGRAM(s) Oral every 6 hours PRN Mild Pain (1 - 3)  aluminum hydroxide/magnesium hydroxide/simethicone Suspension 30 milliLiter(s) Oral four times a day PRN Indigestion  magnesium hydroxide Suspension 30 milliLiter(s) Oral daily PRN Constipation  morphine  - Injectable 2 milliGRAM(s) IV Push every 6 hours PRN severe breakthrough pain  ondansetron Injectable 4 milliGRAM(s) IV Push every 6 hours PRN Nausea and/or Vomiting  oxyCODONE    IR 5 milliGRAM(s) Oral every 6 hours PRN Severe Pain (7 - 10)  oxyCODONE    IR 2.5 milliGRAM(s) Oral every 6 hours PRN Moderate Pain (4 - 6)      Vital Signs Last 24 Hrs  T(C): 36.8 (30 May 2018 16:37), Max: 37.2 (29 May 2018 19:30)  T(F): 98.2 (30 May 2018 16:37), Max: 99 (30 May 2018 00:16)  HR: 85 (30 May 2018 16:37) (78 - 89)  BP: 107/64 (30 May 2018 16:37) (107/64 - 145/72)  BP(mean): --  RR: 18 (30 May 2018 16:37) (18 - 18)  SpO2: 98% (30 May 2018 16:37) (93% - 98%)  CAPILLARY BLOOD GLUCOSE        I&O's Summary    29 May 2018 07:01  -  30 May 2018 07:00  --------------------------------------------------------  IN: 1107 mL / OUT: 1225 mL / NET: -118 mL    30 May 2018 07:01  -  30 May 2018 17:59  --------------------------------------------------------  IN: 185 mL / OUT: 300 mL / NET: -115 mL        PHYSICAL EXAM:  GENERAL: NAD, well-developed  HEAD:  Atraumatic, Normocephalic  EYES: EOMI, PERRLA, conjunctiva and sclera clear  NECK: Supple, No JVD  CHEST/LUNG: Clear to auscultation bilaterally; No wheeze  HEART: Regular rate and rhythm; No murmurs, rubs, or gallops  ABDOMEN: Soft, Nontender, Nondistended; Bowel sounds present  EXTREMITIES:  2+ Peripheral Pulses, No clubbing, cyanosis, or edema  LLE in cast, able to move toes, toes are warm, tactile sensitivity present  NEUROLOGY: AAOx3, non-focal  PSYCH: calm  SKIN: No rashes or lesions    LABS:                        9.9    19.5  )-----------( 205      ( 30 May 2018 11:09 )             30.8     05    138  |  103  |  22  ----------------------------<  118<H>  4.1   |  22  |  0.81    Ca    8.9      30 May 2018 11:03    TPro  6.7  /  Alb  4.4  /  TBili  0.6  /  DBili  x   /  AST  20  /  ALT  21  /  AlkPhos  152<H>      PT/INR - ( 29 May 2018 04:21 )   PT: 10.5 sec;   INR: 0.96 ratio         PTT - ( 29 May 2018 04:21 )  PTT:26.7 sec      Urinalysis Basic - ( 30 May 2018 15:42 )    Color: PL Yellow / Appearance: Clear / S.011 / pH: x  Gluc: x / Ketone: Negative  / Bili: Negative / Urobili: Negative   Blood: x / Protein: Negative / Nitrite: Negative   Leuk Esterase: Negative / RBC: x / WBC x   Sq Epi: x / Non Sq Epi: x / Bacteria: x        RADIOLOGY & ADDITIONAL TESTS: reviewed    Imaging Personally Reviewed:      Goals of Care:     ADVANCE DIRECTIVES:  [ ] YES [ x] NO   DNR [ ] YES [x ] NO                      MOLST  [ ] YES [ x] NO    Living Will  [ ] YES [x ] NO

## 2018-05-30 NOTE — DISCHARGE NOTE ADULT - REASON FOR ADMISSION
Open Left Ankle Fracture Open Left Ankle Fracture/Left Tibial Plateau fracture-ORIF Left ankle fracture

## 2018-05-30 NOTE — PHYSICAL THERAPY INITIAL EVALUATION ADULT - PERTINENT HX OF CURRENT PROBLEM, REHAB EVAL
89 year old female, s/p mechanical fall, not on anticoagulation who presents with an open L ankle fracture reduced by ortho s/p repair now POD 1

## 2018-05-30 NOTE — DISCHARGE NOTE ADULT - MEDICATION SUMMARY - MEDICATIONS TO TAKE
I will START or STAY ON the medications listed below when I get home from the hospital:    lansoprazole 15 mg oral tablet, disintegrating  --   once a day   -- Indication: For GI prevention    methylPREDNISolone 4 mg oral tablet  -- 1 tab(s) by mouth once a day  -- Indication: For steroids    acetaminophen 325 mg oral tablet  -- 2 tab(s) by mouth every 6 hours, As needed, For Temp greater than 38 C (100.4 F)  -- Indication: For fever    acetaminophen 325 mg oral tablet  -- 2 tab(s) by mouth every 6 hours, As needed, Mild Pain (1 - 3)  -- Indication: For pain    oxyCODONE  -- 2.5 milligram(s) by mouth every 4 hours, As Needed  -- Indication: For pain    oxyCODONE 5 mg oral tablet  -- 1 tab(s) by mouth every 6 hours, As needed, Severe Pain (7 - 10)  -- Indication: For pain    aspirin 81 mg oral delayed release tablet  -- 1 tab(s) by mouth 2 times a day x 6 weeks total then resume regular aspirin regimen  -- Indication: For dvt prevention    Benicar 20 mg oral tablet  -- 1  by mouth once a day   -- Indication: For blood pressure    simvastatin 20 mg oral tablet  -- 1  by mouth once a day   -- Indication: For cholesterol    docusate sodium 100 mg oral capsule  -- 1 cap(s) by mouth 3 times a day  -- Indication: For stool softener    Multiple Vitamins oral tablet  -- 1 tab(s) by mouth once a day  -- Indication: For supplement    folic acid 1 mg oral tablet  -- 1 tab(s) by mouth once a day  -- Indication: For supplement    folic acid 1 mg oral tablet  -- 1  by mouth once a day   -- Indication: For supplement

## 2018-05-30 NOTE — PHYSICAL THERAPY INITIAL EVALUATION ADULT - IMPAIRMENTS FOUND, PT EVAL
gait, locomotion, and balance/arousal, attention, and cognition/muscle strength/aerobic capacity/endurance

## 2018-05-30 NOTE — CONSULT NOTE ADULT - ASSESSMENT
#Aftercare following Lt ankle(type I or II open trimalleolar fracture of left ankle) and tibial plateau Fx/Sx  WB as per primary team  #Pain management with tylenol(do not exceed 3gm of acetaminophen per 24 hrs) and Oxycodone  #Slow transit constipation  monitor bowel movements  may reinforce bowel regiment if no bowel movements POD#3(72hrs)  #Unsteady gait/fall prevention discussed with patient and care team  #Anxiety: worried that she has to go back home to care for her   would think that benefit from [facility] of her choice  may need social work support to help arranging care for her  while she is ill  (As per patient daughter lives in [place] but now traveling daily to care for her )

## 2018-05-30 NOTE — DISCHARGE NOTE ADULT - MEDICATION SUMMARY - MEDICATIONS TO STOP TAKING
I will STOP taking the medications listed below when I get home from the hospital:    alendronate 70 mg oral tablet  -- 1  by mouth once a day    methotrexate 2.5 mg oral tablet 4 tabs weekly    vitamin d 800iu daily    predniSONE 20 mg oral tablet  -- 1  by mouth    allopurinol 100 mg oral tablet  -- 1  by mouth

## 2018-05-30 NOTE — PROGRESS NOTE ADULT - SUBJECTIVE AND OBJECTIVE BOX
VASCULAR SURGERY NOTE    Patient is s/p repair of fractured L medial malleolus. Patient with no new events over night.    PHYSICAL EXAM  Vital Signs Last 24 Hrs  T(C): 36.8 (30 May 2018 05:19), Max: 37.2 (29 May 2018 19:30)  T(F): 98.2 (30 May 2018 05:19), Max: 99 (30 May 2018 00:16)  HR: 78 (30 May 2018 05:19) (78 - 93)  BP: 119/67 (30 May 2018 05:20) (93/51 - 153/69)  BP(mean): 91 (29 May 2018 13:00) (69 - 103)  RR: 18 (30 May 2018 05:19) (14 - 18)  SpO2: 95% (30 May 2018 05:19) (93% - 100%)    I&O's Summary    29 May 2018 07:01  -  30 May 2018 07:00  --------------------------------------------------------  IN: 1107 mL / OUT: 1225 mL / NET: -118 mL        GEN: NAD  ABD: deferred  VASC:LLE wrapped with dressing CDI toes pink with cap refil <2 seconds    LABS:                        12.5   25.0  )-----------( 320      ( 29 May 2018 04:21 )             39.7     05-29    141  |  102  |  33<H>  ----------------------------<  121<H>  5.2   |  23  |  1.03    Ca    10.1      29 May 2018 04:21    TPro  6.7  /  Alb  4.4  /  TBili  0.6  /  DBili  x   /  AST  20  /  ALT  21  /  AlkPhos  152<H>  05-29    Lactate:    PT/INR - ( 29 May 2018 04:21 )   PT: 10.5 sec;   INR: 0.96 ratio         PTT - ( 29 May 2018 04:21 )  PTT:26.7 sec VASCULAR SURGERY NOTE    Patient is s/p repair of fractured L medial malleolus. Patient with no new events over night.    PHYSICAL EXAM  Vital Signs Last 24 Hrs  T(C): 36.8 (30 May 2018 05:19), Max: 37.2 (29 May 2018 19:30)  T(F): 98.2 (30 May 2018 05:19), Max: 99 (30 May 2018 00:16)  HR: 78 (30 May 2018 05:19) (78 - 93)  BP: 119/67 (30 May 2018 05:20) (93/51 - 153/69)  BP(mean): 91 (29 May 2018 13:00) (69 - 103)  RR: 18 (30 May 2018 05:19) (14 - 18)  SpO2: 95% (30 May 2018 05:19) (93% - 100%)    I&O's Summary    29 May 2018 07:01  -  30 May 2018 07:00  --------------------------------------------------------  IN: 1107 mL / OUT: 1225 mL / NET: -118 mL        GEN: NAD  ABD: deferred  VASC:LLE wrapped with dressing CDI toes pink with cap refil <2 seconds  RLE mod venous insuff w mod edema     LABS:                        12.5   25.0  )-----------( 320      ( 29 May 2018 04:21 )             39.7     05-29    141  |  102  |  33<H>  ----------------------------<  121<H>  5.2   |  23  |  1.03    Ca    10.1      29 May 2018 04:21    TPro  6.7  /  Alb  4.4  /  TBili  0.6  /  DBili  x   /  AST  20  /  ALT  21  /  AlkPhos  152<H>  05-29    Lactate:    PT/INR - ( 29 May 2018 04:21 )   PT: 10.5 sec;   INR: 0.96 ratio         PTT - ( 29 May 2018 04:21 )  PTT:26.7 sec

## 2018-05-30 NOTE — PHYSICAL THERAPY INITIAL EVALUATION ADULT - ADDITIONAL COMMENTS
patient lives in a private house with spouse  and has few steps to negotiate, has RW and cane but does not use much.

## 2018-05-30 NOTE — DISCHARGE NOTE ADULT - MEDICATION SUMMARY - MEDICATIONS TO CHANGE
I will SWITCH the dose or number of times a day I take the medications listed below when I get home from the hospital:    Aspir 81 oral enteric coated tablet  -- 1  by mouth once a day    Medrol 2mg daily

## 2018-05-31 PROCEDURE — 93971 EXTREMITY STUDY: CPT | Mod: 26

## 2018-05-31 PROCEDURE — 99232 SBSQ HOSP IP/OBS MODERATE 35: CPT

## 2018-05-31 RX ADMIN — Medication 650 MILLIGRAM(S): at 13:25

## 2018-05-31 RX ADMIN — Medication 100 MILLIGRAM(S): at 12:56

## 2018-05-31 RX ADMIN — Medication 1 MILLIGRAM(S): at 12:56

## 2018-05-31 RX ADMIN — Medication 1 TABLET(S): at 12:56

## 2018-05-31 RX ADMIN — SIMVASTATIN 20 MILLIGRAM(S): 20 TABLET, FILM COATED ORAL at 21:26

## 2018-05-31 RX ADMIN — Medication 4 MILLIGRAM(S): at 05:38

## 2018-05-31 RX ADMIN — Medication 81 MILLIGRAM(S): at 21:26

## 2018-05-31 RX ADMIN — Medication 81 MILLIGRAM(S): at 12:57

## 2018-05-31 RX ADMIN — LOSARTAN POTASSIUM 50 MILLIGRAM(S): 100 TABLET, FILM COATED ORAL at 05:38

## 2018-05-31 RX ADMIN — Medication 100 MILLIGRAM(S): at 05:39

## 2018-05-31 RX ADMIN — Medication 650 MILLIGRAM(S): at 12:56

## 2018-05-31 NOTE — CHART NOTE - NSCHARTNOTEFT_GEN_A_CORE
Fit and apply left [place] hinged knee orthosis locked in extension. Applied over short leg cast. Written instructions and contact information left bedside. To notify office with any issues questions or concerns.  Jorge CONRAD  [facility]  [phone]

## 2018-05-31 NOTE — OCCUPATIONAL THERAPY INITIAL EVALUATION ADULT - TRANSFER SAFETY CONCERNS NOTED: SIT/STAND, REHAB EVAL
losing balance/inability to maintain weight-bearing restrictions w/o assist/decreased weight-shifting ability/decreased balance during turns

## 2018-05-31 NOTE — OCCUPATIONAL THERAPY INITIAL EVALUATION ADULT - BALANCE TRAINING, PT EVAL
GOAL: Patient will increase static/dynamic sitting/standing balance by 1/2 grade to facilitate increased ability to perform ADLs and functional mobility

## 2018-05-31 NOTE — OCCUPATIONAL THERAPY INITIAL EVALUATION ADULT - PERTINENT HX OF CURRENT PROBLEM, REHAB EVAL
89y Female ambulatory with walker presents as level 2 trauma with open left ankle fracture/dislocation. Denies HS/LOC. No other pain/injuries. Denies fevers/chills.

## 2018-05-31 NOTE — OCCUPATIONAL THERAPY INITIAL EVALUATION ADULT - DIAGNOSIS, OT EVAL
Patient presents with decreased balance, strength, endurance impacting ability to perform ADLs and functional mobility

## 2018-05-31 NOTE — OCCUPATIONAL THERAPY INITIAL EVALUATION ADULT - ADL RETRAINING, OT EVAL
GOAL: Pt will perform LB dressing with min A in two weeks GOAL: Pt will be min A with toileting in two weeks

## 2018-05-31 NOTE — PROGRESS NOTE ADULT - SUBJECTIVE AND OBJECTIVE BOX
Patient is a 89y old  Female who presents with a chief complaint of Open Left Ankle Fracture (30 May 2018 17:20)      Vascular Surgery Attending Progress Note    Interval HPI: pt w/o c/o     Medications:  acetaminophen   Tablet 650 milliGRAM(s) Oral every 6 hours PRN  acetaminophen   Tablet. 650 milliGRAM(s) Oral every 6 hours PRN  aluminum hydroxide/magnesium hydroxide/simethicone Suspension 30 milliLiter(s) Oral four times a day PRN  aspirin enteric coated 81 milliGRAM(s) Oral two times a day  docusate sodium 100 milliGRAM(s) Oral three times a day  folic acid 1 milliGRAM(s) Oral daily  losartan 50 milliGRAM(s) Oral daily  magnesium hydroxide Suspension 30 milliLiter(s) Oral daily PRN  methylPREDNISolone 4 milliGRAM(s) Oral daily  morphine  - Injectable 2 milliGRAM(s) IV Push every 6 hours PRN  multivitamin 1 Tablet(s) Oral daily  ondansetron Injectable 4 milliGRAM(s) IV Push every 6 hours PRN  oxyCODONE    IR 5 milliGRAM(s) Oral every 6 hours PRN  oxyCODONE    IR 2.5 milliGRAM(s) Oral every 6 hours PRN  simvastatin 20 milliGRAM(s) Oral at bedtime  sodium chloride 0.9%. 1000 milliLiter(s) IV Continuous <Continuous>      Vital Signs Last 24 Hrs  T(C): 36.5 (31 May 2018 16:22), Max: 37.1 (31 May 2018 04:23)  T(F): 97.7 (31 May 2018 16:22), Max: 98.8 (31 May 2018 09:02)  HR: 73 (31 May 2018 16:22) (72 - 96)  BP: 108/57 (31 May 2018 16:22) (97/60 - 151/77)  BP(mean): --  RR: 18 (31 May 2018 16:22) (18 - 18)  SpO2: 96% (31 May 2018 16:22) (96% - 98%)  I&O's Summary    30 May 2018 07:01  -  31 May 2018 07:00  --------------------------------------------------------  IN: 415 mL / OUT: 1600 mL / NET: -1185 mL    31 May 2018 07:01  -  31 May 2018 21:00  --------------------------------------------------------  IN: 385 mL / OUT: 425 mL / NET: -40 mL        Physical Exam:  Neuro  A&Ox3 VSS  Vascular:  mod rle edema remains       LABS:                        9.9    19.5  )-----------( 205      ( 30 May 2018 11:09 )             30.8     05-30    138  |  103  |  22  ----------------------------<  118<H>  4.1   |  22  |  0.81    Ca    8.9      30 May 2018 11:03      RLE venous Duplex neg for dvt    KEVIN VERDUZCO MD  [phone]

## 2018-05-31 NOTE — OCCUPATIONAL THERAPY INITIAL EVALUATION ADULT - MANUAL MUSCLE TESTING RESULTS, REHAB EVAL
grossly assessed due to/LUE 3/5, R shoulder 3-/5, distal to shoulder on LUE 3/5, RLE 3/5, LLE NT NWB with cast

## 2018-05-31 NOTE — PROGRESS NOTE ADULT - SUBJECTIVE AND OBJECTIVE BOX
ORTHO  Patient is a 89y old  Female who presents with a chief complaint of Open Left Ankle Fracture (30 May 2018 17:20)    Pt. resting without complaint    VS-  T(C): 37.1 (05-31-18 @ 04:23), Max: 37.1 (05-31-18 @ 04:23)  HR: 76 (05-31-18 @ 04:23) (76 - 89)  BP: 147/66 (05-31-18 @ 04:23) (107/64 - 151/77)  RR: 18 (05-31-18 @ 04:23) (18 - 18)  SpO2: 96% (05-31-18 @ 04:23) (96% - 98%)  Wt(kg): --    M.S. A&O  Extremity- Left LE- [place] brace in place, Short Leg Cast in place  Digits- pink, mobile, warm with sensation grossly intact to light touch                               9.9    19.5  )-----------( 205      ( 30 May 2018 11:09 )             30.8     05-30    138  |  103  |  22  ----------------------------<  118<H>  4.1   |  22  |  0.81    Ca    8.9      30 May 2018 11:03

## 2018-05-31 NOTE — OCCUPATIONAL THERAPY INITIAL EVALUATION ADULT - TRANSFER SAFETY CONCERNS NOTED: BED/CHAIR, REHAB EVAL
inability to maintain weight-bearing restrictions w/o assist/decreased weight-shifting ability/decreased balance during turns/losing balance

## 2018-05-31 NOTE — PROGRESS NOTE ADULT - ASSESSMENT
Impression: Stable       Plan:   Continue present treatment                 Out of bed, ambulate, non-weight bearing                             Physical therapy follow up                 Continue to monitor      Gianluca Austin PA-C  Orthopaedic Surgery  Team pager [phone]  [phone]

## 2018-05-31 NOTE — PROGRESS NOTE ADULT - ASSESSMENT
Patient is a 89 year old female, s/p mechanical fall, not on anticoagulation who presents with an open L ankle fracture reduced by ortho s/p repair now POD 1    plan leg elevation and comp stockings  for venous insuff  will follow

## 2018-05-31 NOTE — OCCUPATIONAL THERAPY INITIAL EVALUATION ADULT - ADDITIONAL COMMENTS
CT LLE 5/29 1.  Trimalleolar fracture-dislocation of the left ankle consistent with a supination exorotation mechanism. Nondisplaced lateral tibial plateau fracture, incompletely imaged.  Minimally displaced fracture of the proximal fibula.

## 2018-06-01 VITALS
DIASTOLIC BLOOD PRESSURE: 73 MMHG | HEART RATE: 75 BPM | OXYGEN SATURATION: 98 % | RESPIRATION RATE: 18 BRPM | SYSTOLIC BLOOD PRESSURE: 110 MMHG | TEMPERATURE: 98 F

## 2018-06-01 DIAGNOSIS — S82.892B OTHER FRACTURE OF LEFT LOWER LEG, INITIAL ENCOUNTER FOR OPEN FRACTURE TYPE I OR II: ICD-10-CM

## 2018-06-01 LAB
ANION GAP SERPL CALC-SCNC: 10 MMOL/L — SIGNIFICANT CHANGE UP (ref 5–17)
BUN SERPL-MCNC: 29 MG/DL — HIGH (ref 7–23)
CALCIUM SERPL-MCNC: 8.5 MG/DL — SIGNIFICANT CHANGE UP (ref 8.4–10.5)
CHLORIDE SERPL-SCNC: 104 MMOL/L — SIGNIFICANT CHANGE UP (ref 96–108)
CO2 SERPL-SCNC: 23 MMOL/L — SIGNIFICANT CHANGE UP (ref 22–31)
CREAT SERPL-MCNC: 0.96 MG/DL — SIGNIFICANT CHANGE UP (ref 0.5–1.3)
GLUCOSE SERPL-MCNC: 224 MG/DL — HIGH (ref 70–99)
POTASSIUM SERPL-MCNC: 4.3 MMOL/L — SIGNIFICANT CHANGE UP (ref 3.5–5.3)
POTASSIUM SERPL-SCNC: 4.3 MMOL/L — SIGNIFICANT CHANGE UP (ref 3.5–5.3)
SODIUM SERPL-SCNC: 137 MMOL/L — SIGNIFICANT CHANGE UP (ref 135–145)

## 2018-06-01 PROCEDURE — 71045 X-RAY EXAM CHEST 1 VIEW: CPT

## 2018-06-01 PROCEDURE — 97166 OT EVAL MOD COMPLEX 45 MIN: CPT

## 2018-06-01 PROCEDURE — 96375 TX/PRO/DX INJ NEW DRUG ADDON: CPT

## 2018-06-01 PROCEDURE — C1769: CPT

## 2018-06-01 PROCEDURE — 86901 BLOOD TYPING SEROLOGIC RH(D): CPT

## 2018-06-01 PROCEDURE — 80053 COMPREHEN METABOLIC PANEL: CPT

## 2018-06-01 PROCEDURE — 73502 X-RAY EXAM HIP UNI 2-3 VIEWS: CPT

## 2018-06-01 PROCEDURE — 99232 SBSQ HOSP IP/OBS MODERATE 35: CPT

## 2018-06-01 PROCEDURE — 85730 THROMBOPLASTIN TIME PARTIAL: CPT

## 2018-06-01 PROCEDURE — 73562 X-RAY EXAM OF KNEE 3: CPT

## 2018-06-01 PROCEDURE — 81003 URINALYSIS AUTO W/O SCOPE: CPT

## 2018-06-01 PROCEDURE — 97116 GAIT TRAINING THERAPY: CPT

## 2018-06-01 PROCEDURE — 97110 THERAPEUTIC EXERCISES: CPT

## 2018-06-01 PROCEDURE — 93971 EXTREMITY STUDY: CPT

## 2018-06-01 PROCEDURE — 90715 TDAP VACCINE 7 YRS/> IM: CPT

## 2018-06-01 PROCEDURE — 97163 PT EVAL HIGH COMPLEX 45 MIN: CPT

## 2018-06-01 PROCEDURE — 86850 RBC ANTIBODY SCREEN: CPT

## 2018-06-01 PROCEDURE — 96374 THER/PROPH/DIAG INJ IV PUSH: CPT

## 2018-06-01 PROCEDURE — 80048 BASIC METABOLIC PNL TOTAL CA: CPT

## 2018-06-01 PROCEDURE — 73610 X-RAY EXAM OF ANKLE: CPT

## 2018-06-01 PROCEDURE — 86900 BLOOD TYPING SEROLOGIC ABO: CPT

## 2018-06-01 PROCEDURE — 73700 CT LOWER EXTREMITY W/O DYE: CPT

## 2018-06-01 PROCEDURE — 76000 FLUOROSCOPY <1 HR PHYS/QHP: CPT

## 2018-06-01 PROCEDURE — C1713: CPT

## 2018-06-01 PROCEDURE — 76377 3D RENDER W/INTRP POSTPROCES: CPT

## 2018-06-01 PROCEDURE — 93005 ELECTROCARDIOGRAM TRACING: CPT

## 2018-06-01 PROCEDURE — 99285 EMERGENCY DEPT VISIT HI MDM: CPT | Mod: 25

## 2018-06-01 PROCEDURE — 85610 PROTHROMBIN TIME: CPT

## 2018-06-01 PROCEDURE — 72170 X-RAY EXAM OF PELVIS: CPT

## 2018-06-01 PROCEDURE — 85027 COMPLETE CBC AUTOMATED: CPT

## 2018-06-01 RX ORDER — FOLIC ACID 0.8 MG
1 TABLET ORAL
Qty: 0 | Refills: 0 | COMMUNITY
Start: 2018-06-01

## 2018-06-01 RX ORDER — ACETAMINOPHEN 500 MG
2 TABLET ORAL
Qty: 0 | Refills: 0 | COMMUNITY
Start: 2018-06-01

## 2018-06-01 RX ORDER — ASPIRIN/CALCIUM CARB/MAGNESIUM 324 MG
1 TABLET ORAL
Qty: 0 | Refills: 0 | COMMUNITY
Start: 2018-06-01

## 2018-06-01 RX ORDER — OXYCODONE HYDROCHLORIDE 5 MG/1
1 TABLET ORAL
Qty: 0 | Refills: 0 | COMMUNITY
Start: 2018-06-01

## 2018-06-01 RX ORDER — OXYCODONE HYDROCHLORIDE 5 MG/1
2.5 TABLET ORAL
Qty: 0 | Refills: 0 | COMMUNITY
Start: 2018-06-01

## 2018-06-01 RX ORDER — DOCUSATE SODIUM 100 MG
1 CAPSULE ORAL
Qty: 0 | Refills: 0 | COMMUNITY
Start: 2018-06-01

## 2018-06-01 RX ADMIN — Medication 4 MILLIGRAM(S): at 06:10

## 2018-06-01 RX ADMIN — Medication 81 MILLIGRAM(S): at 12:02

## 2018-06-01 RX ADMIN — Medication 1 MILLIGRAM(S): at 12:02

## 2018-06-01 RX ADMIN — Medication 1 TABLET(S): at 12:02

## 2018-06-01 RX ADMIN — Medication 650 MILLIGRAM(S): at 06:37

## 2018-06-01 RX ADMIN — Medication 650 MILLIGRAM(S): at 06:11

## 2018-06-01 RX ADMIN — LOSARTAN POTASSIUM 50 MILLIGRAM(S): 100 TABLET, FILM COATED ORAL at 06:11

## 2018-06-01 RX ADMIN — Medication 100 MILLIGRAM(S): at 12:02

## 2018-06-01 NOTE — PROGRESS NOTE ADULT - PROBLEM SELECTOR PLAN 1
PT/OT-NWB LLE  IS  DVT PPx  Pain Control  Continue Current Tx.  Dispo plan rehab today    Gurinder Stack PA-C  Team Pager: #[phone]
as above

## 2018-06-01 NOTE — PROGRESS NOTE ADULT - SUBJECTIVE AND OBJECTIVE BOX
Patient is a 89y old  Female who presents with a chief complaint of Open Left Ankle Fracture (30 May 2018 17:20)      Vascular Surgery Attending Progress Note    Interval HPI: pt w/o new c/o     Medications:  acetaminophen   Tablet 650 milliGRAM(s) Oral every 6 hours PRN  acetaminophen   Tablet. 650 milliGRAM(s) Oral every 6 hours PRN  aluminum hydroxide/magnesium hydroxide/simethicone Suspension 30 milliLiter(s) Oral four times a day PRN  aspirin enteric coated 81 milliGRAM(s) Oral two times a day  docusate sodium 100 milliGRAM(s) Oral three times a day  folic acid 1 milliGRAM(s) Oral daily  losartan 50 milliGRAM(s) Oral daily  magnesium hydroxide Suspension 30 milliLiter(s) Oral daily PRN  methylPREDNISolone 4 milliGRAM(s) Oral daily  morphine  - Injectable 2 milliGRAM(s) IV Push every 6 hours PRN  multivitamin 1 Tablet(s) Oral daily  ondansetron Injectable 4 milliGRAM(s) IV Push every 6 hours PRN  oxyCODONE    IR 5 milliGRAM(s) Oral every 6 hours PRN  oxyCODONE    IR 2.5 milliGRAM(s) Oral every 6 hours PRN  simvastatin 20 milliGRAM(s) Oral at bedtime  sodium chloride 0.9%. 1000 milliLiter(s) IV Continuous <Continuous>      Vital Signs Last 24 Hrs  T(C): 36.7 (01 Jun 2018 08:12), Max: 37.1 (31 May 2018 09:02)  T(F): 98.1 (01 Jun 2018 08:12), Max: 98.8 (31 May 2018 09:02)  HR: 72 (01 Jun 2018 08:12) (71 - 96)  BP: 103/65 (01 Jun 2018 08:12) (97/60 - 150/73)  BP(mean): --  RR: 18 (01 Jun 2018 08:12) (18 - 18)  SpO2: 97% (01 Jun 2018 08:12) (96% - 99%)  I&O's Summary    31 May 2018 07:01  -  01 Jun 2018 07:00  --------------------------------------------------------  IN: 885 mL / OUT: 625 mL / NET: 260 mL        Physical Exam:  Neuro  A&Ox3 VSS  Vascular:  mild edema  stable     LABS:                        9.9    19.5  )-----------( 205      ( 30 May 2018 11:09 )             30.8     05-30    138  |  103  |  22  ----------------------------<  118<H>  4.1   |  22  |  0.81    Ca    8.9      30 May 2018 11:03          KEVIN VERDUZCO MD  [phone]

## 2018-06-01 NOTE — PROGRESS NOTE ADULT - PROBLEM SELECTOR PROBLEM 1
Type I or II open fracture of left ankle, initial encounter
Venous insufficiency of both lower extremities

## 2018-06-01 NOTE — PROGRESS NOTE ADULT - ASSESSMENT
Patient is a 89 year old female, s/p mechanical fall, not on anticoagulation who presents with an open L ankle fracture reduced by ortho s/p repair now POD 1    plan leg elevation and knee high comp stockings 15-20mm HG for venous insuff rx given   f/u as outpt  reconsult prn

## 2018-06-01 NOTE — PROGRESS NOTE ADULT - SUBJECTIVE AND OBJECTIVE BOX
Post op Day [3 ]    Patient resting without complaints.  No chest pain, SOB, N/V.    T(C): 37 (06-01-18 @ 04:40), Max: 37.1 (05-31-18 @ 09:02)  HR: 83 (06-01-18 @ 06:08) (71 - 96)  BP: 125/68 (06-01-18 @ 06:08) (97/60 - 150/73)  RR: 18 (06-01-18 @ 06:08) (18 - 18)  SpO2: 98% (06-01-18 @ 06:08) (96% - 99%)  Wt(kg): --    Exam:  Alert and Oriented, No Acute Distress  LLE compartments soft/compressible in [place] LIE  L SLC with window, xeroform dsg intact.  Cast C/d/i. digits pink, warm, mobile with brisk cap refill <2 seconds  Calves Soft, Non-tender bilaterally                        9.9    19.5  )-----------( 205      ( 30 May 2018 11:09 )             30.8    05-30    138  |  103  |  22  ----------------------------<  118<H>  4.1   |  22  |  0.81    Ca    8.9      30 May 2018 11:03

## 2018-06-01 NOTE — PROGRESS NOTE ADULT - PROBLEM SELECTOR PROBLEM 2
Lymphedema of both lower extremities

## 2018-06-14 ENCOUNTER — APPOINTMENT (OUTPATIENT)
Dept: ORTHOPEDIC SURGERY | Facility: CLINIC | Age: 83
End: 2018-06-14
Payer: MEDICARE

## 2018-06-14 VITALS
SYSTOLIC BLOOD PRESSURE: 119 MMHG | WEIGHT: 101 LBS | HEART RATE: 72 BPM | BODY MASS INDEX: 19.83 KG/M2 | DIASTOLIC BLOOD PRESSURE: 89 MMHG | HEIGHT: 60 IN

## 2018-06-14 DIAGNOSIS — S82.892C: ICD-10-CM

## 2018-06-14 PROCEDURE — 73564 X-RAY EXAM KNEE 4 OR MORE: CPT | Mod: LT

## 2018-06-14 PROCEDURE — 99024 POSTOP FOLLOW-UP VISIT: CPT

## 2018-06-14 PROCEDURE — 73610 X-RAY EXAM OF ANKLE: CPT | Mod: LT

## 2018-06-14 PROCEDURE — 29425 APPL SHORT LEG CAST WALKING: CPT | Mod: LT

## 2018-06-14 RX ORDER — AZITHROMYCIN 250 MG/1
250 TABLET, FILM COATED ORAL
Qty: 6 | Refills: 0 | Status: ACTIVE | COMMUNITY
Start: 2017-12-19

## 2018-06-14 RX ORDER — SIMVASTATIN 20 MG/1
20 TABLET, FILM COATED ORAL
Refills: 0 | Status: ACTIVE | COMMUNITY

## 2018-06-14 RX ORDER — MENTHOL/CAMPHOR 0.5 %-0.5%
1000 LOTION (ML) TOPICAL
Refills: 0 | Status: ACTIVE | COMMUNITY

## 2018-06-14 RX ORDER — OLMESARTAN MEDOXOMIL 20 MG/1
20 TABLET, FILM COATED ORAL
Refills: 0 | Status: ACTIVE | COMMUNITY

## 2018-06-14 RX ORDER — FOLIC ACID 1 MG/1
1 TABLET ORAL
Refills: 0 | Status: ACTIVE | COMMUNITY

## 2018-06-14 RX ORDER — CHLORHEXIDINE GLUCONATE, 0.12% ORAL RINSE 1.2 MG/ML
0.12 SOLUTION DENTAL
Qty: 473 | Refills: 0 | Status: ACTIVE | COMMUNITY
Start: 2017-12-19

## 2018-07-09 ENCOUNTER — TRANSCRIPTION ENCOUNTER (OUTPATIENT)
Age: 83
End: 2018-07-09

## 2018-07-23 ENCOUNTER — APPOINTMENT (OUTPATIENT)
Dept: ORTHOPEDIC SURGERY | Facility: CLINIC | Age: 83
End: 2018-07-23
Payer: MEDICARE

## 2018-07-23 DIAGNOSIS — Z78.9 OTHER SPECIFIED HEALTH STATUS: ICD-10-CM

## 2018-07-23 DIAGNOSIS — M25.561 PAIN IN RIGHT KNEE: ICD-10-CM

## 2018-07-23 PROCEDURE — 99024 POSTOP FOLLOW-UP VISIT: CPT

## 2018-07-23 PROCEDURE — 73610 X-RAY EXAM OF ANKLE: CPT | Mod: LT

## 2018-07-23 PROCEDURE — 73560 X-RAY EXAM OF KNEE 1 OR 2: CPT | Mod: RT

## 2018-08-09 ENCOUNTER — APPOINTMENT (OUTPATIENT)
Dept: ORTHOPEDIC SURGERY | Facility: CLINIC | Age: 83
End: 2018-08-09

## 2018-08-23 ENCOUNTER — APPOINTMENT (OUTPATIENT)
Dept: ORTHOPEDIC SURGERY | Facility: CLINIC | Age: 83
End: 2018-08-23
Payer: MEDICARE

## 2018-08-23 PROCEDURE — 73610 X-RAY EXAM OF ANKLE: CPT | Mod: LT

## 2018-08-23 PROCEDURE — 73562 X-RAY EXAM OF KNEE 3: CPT | Mod: LT

## 2018-08-23 PROCEDURE — 99024 POSTOP FOLLOW-UP VISIT: CPT

## 2018-08-30 ENCOUNTER — CHART COPY (OUTPATIENT)
Age: 83
End: 2018-08-30

## 2018-08-30 RX ORDER — MUPIROCIN 2 G/100G
2 CREAM TOPICAL TWICE DAILY
Qty: 1 | Refills: 0 | Status: ACTIVE | COMMUNITY
Start: 2018-08-30 | End: 1900-01-01

## 2018-10-04 ENCOUNTER — APPOINTMENT (OUTPATIENT)
Dept: ORTHOPEDIC SURGERY | Facility: CLINIC | Age: 83
End: 2018-10-04
Payer: MEDICARE

## 2018-10-04 DIAGNOSIS — S82.892F: ICD-10-CM

## 2018-10-04 DIAGNOSIS — S82.142D DISPLACED BICONDYLAR FRACTURE OF LEFT TIBIA, SUBSEQUENT ENCOUNTER FOR CLOSED FRACTURE WITH ROUTINE HEALING: ICD-10-CM

## 2018-10-04 DIAGNOSIS — M17.11 UNILATERAL PRIMARY OSTEOARTHRITIS, RIGHT KNEE: ICD-10-CM

## 2018-10-04 PROCEDURE — 73560 X-RAY EXAM OF KNEE 1 OR 2: CPT | Mod: RT

## 2018-10-04 PROCEDURE — 73610 X-RAY EXAM OF ANKLE: CPT | Mod: LT

## 2018-10-04 PROCEDURE — 99214 OFFICE O/P EST MOD 30 MIN: CPT

## 2018-10-10 ENCOUNTER — RX RENEWAL (OUTPATIENT)
Age: 83
End: 2018-10-10

## 2018-10-17 PROBLEM — M17.11 DEGENERATIVE ARTHRITIS OF RIGHT KNEE: Status: ACTIVE | Noted: 2018-07-23

## 2018-12-07 ENCOUNTER — MEDICATION RENEWAL (OUTPATIENT)
Age: 83
End: 2018-12-07

## 2018-12-07 DIAGNOSIS — E78.00 PURE HYPERCHOLESTEROLEMIA, UNSPECIFIED: ICD-10-CM

## 2018-12-17 RX ORDER — METHYLPREDNISOLONE 4 MG/1
4 TABLET ORAL DAILY
Qty: 90 | Refills: 3 | Status: ACTIVE | COMMUNITY
Start: 1900-01-01 | End: 1900-01-01

## 2018-12-29 ENCOUNTER — EMERGENCY (EMERGENCY)
Facility: HOSPITAL | Age: 83
LOS: 0 days | End: 2018-12-29
Attending: EMERGENCY MEDICINE
Payer: MEDICARE

## 2018-12-29 VITALS — HEIGHT: 66 IN | WEIGHT: 119.93 LBS | OXYGEN SATURATION: 53 %

## 2018-12-29 DIAGNOSIS — Z88.9 ALLERGY STATUS TO UNSPECIFIED DRUGS, MEDICAMENTS AND BIOLOGICAL SUBSTANCES: ICD-10-CM

## 2018-12-29 DIAGNOSIS — Z88.2 ALLERGY STATUS TO SULFONAMIDES: ICD-10-CM

## 2018-12-29 DIAGNOSIS — I46.9 CARDIAC ARREST, CAUSE UNSPECIFIED: ICD-10-CM

## 2018-12-29 DIAGNOSIS — R07.9 CHEST PAIN, UNSPECIFIED: ICD-10-CM

## 2018-12-29 DIAGNOSIS — Z91.041 RADIOGRAPHIC DYE ALLERGY STATUS: ICD-10-CM

## 2018-12-29 LAB — GLUCOSE BLDC GLUCOMTR-MCNC: 126 MG/DL — HIGH (ref 70–99)

## 2018-12-29 PROCEDURE — 99285 EMERGENCY DEPT VISIT HI MDM: CPT

## 2018-12-29 NOTE — ED PROVIDER NOTE - CRITICAL CARE PROVIDED
documentation/consult w/ pt's family directly relating to pts condition/conducted a detailed discussion of DNR status/consultation with other physicians/direct patient care (not related to procedure)/interpretation of diagnostic studies/additional history taking

## 2018-12-29 NOTE — ED PROVIDER NOTE - MEDICAL DECISION MAKING DETAILS
ACLS attempted for 20 more minutes, had few seconds of rosc with very faint pulse but again into PEA. US with some activity but no meaningful cardiac contractions. .

## 2018-12-29 NOTE — ED PROVIDER NOTE - OBJECTIVE STATEMENT
89yo female from home with pmh HTN, HL, rheumatoid presents in cardiac arrest. Per EMS, pt had been c/o chest pain all day. Family came home and called EMS. With EMS, pt was alert and oriented but c/o CP. EKG showed STD in v2-3, and II, III, avf. They moved her on to stretcher and she collapsed into cardiac arrest. EKG was on at that time and noted diffuse LINH in v2-6. Pt was intubated, epi x 4, and bicarb x 1, 1 defib for VT, pt in PEA for majority of time ~ 45 min. Pt would only have rosc for a few seconds. Per family, pt was c/o sob x 2 weeks. Pt also recently dc from rehab for tibial plateau fx in May. PMD julita mcknight [phone]    meds: lansoprazole, simvastatin, methotrexate, alnendronate, allopurinol,

## 2018-12-29 NOTE — ED PROVIDER NOTE - PHYSICAL EXAMINATION
Gen: intubated  HEAD: AT, pupils fixed and dilated  Neck: supple,   Pulm: no spon ae  CV: no spon cardiac activity   Abd: soft  Mskel: no edema/erythema/cyanosis   Skin: no rash   Neuro: no spon activity    bedside sono: with cardiac activity noted but no good functional activity that would give output/contractions, no dilated RV

## 2018-12-29 NOTE — ED ADULT TRIAGE NOTE - CHIEF COMPLAINT QUOTE
biba as per ems patient was responsive and went into cardiac arrest in apartment. 4 epi, 1 bicarb, shocked once in the field

## 2020-04-28 ENCOUNTER — TRANSCRIPTION ENCOUNTER (OUTPATIENT)
Age: 85
End: 2020-04-28

## 2021-08-18 NOTE — ED ADULT NURSE NOTE - NS ED NURSE RECORD ANOTHER HT AND WT
What Type Of Note Output Would You Prefer (Optional)?: Bullet Format How Severe Are Your Spot(S)?: mild Have Your Spot(S) Been Treated In The Past?: has not been treated Hpi Title: Evaluation of Skin Lesions Yes

## 2022-04-18 NOTE — PATIENT PROFILE ADULT. - FUNCTIONAL SCREEN CURRENT LEVEL: EATING, MLM
Pt called asking for a virtual   On wednesdayShe is not sick    It would just save her on travel?????? (0) independent

## 2024-02-13 NOTE — PATIENT PROFILE ADULT. - PAIN CHRONIC, PROFILE
T(C): 36.5 (02-13-24 @ 19:35), Max: 36.6 (02-13-24 @ 18:30)  HR: 80 (02-13-24 @ 19:34) (80 - 88)  BP: 118/73 (02-13-24 @ 19:34) (112/68 - 118/73)  RR: 15 (02-13-24 @ 19:35) (15 - 15)  SpO2: --  – PE:   CV: RRR  Pulm: breathing comfortably on RA  Abd: soft, gravid, nontender  Extr: moving all extremities with ease  TAUS: images saved in ASOB, vertex position, __ placenta, M mode:  FHR, NICOLE:  NST: baseline ___ FHR, ___ variability, + accels, - decels, ___reactive T(C): 36.5 (02-13-24 @ 19:35), Max: 36.6 (02-13-24 @ 18:30)  HR: 80 (02-13-24 @ 19:34) (80 - 88)  BP: 118/73 (02-13-24 @ 19:34) (112/68 - 118/73)  RR: 15 (02-13-24 @ 19:35) (15 - 15)  SpO2: --  – PE:   CV: RRR  Pulm: breathing comfortably on RA  Abd: soft, gravid, nontender  Extr: moving all extremities with ease  TAUS: images saved in ASOB, breech position, anterior placenta, NICOLE: 9.85, BPP 8/8  NST: baseline 145 FHR, moderate variability, + accels, - decels, reactive yes